# Patient Record
Sex: FEMALE | Race: WHITE | NOT HISPANIC OR LATINO | Employment: FULL TIME | URBAN - NONMETROPOLITAN AREA
[De-identification: names, ages, dates, MRNs, and addresses within clinical notes are randomized per-mention and may not be internally consistent; named-entity substitution may affect disease eponyms.]

---

## 2017-01-10 RX ORDER — NORETHINDRONE AND ETHINYL ESTRADIOL AND FERROUS FUMARATE 0.4-35(21)
1 KIT ORAL DAILY
Qty: 28 EACH | Refills: 9 | Status: SHIPPED | OUTPATIENT
Start: 2017-01-10 | End: 2017-02-13 | Stop reason: SDUPTHER

## 2017-08-24 ENCOUNTER — PATIENT MESSAGE (OUTPATIENT)
Dept: OBSTETRICS AND GYNECOLOGY | Facility: CLINIC | Age: 22
End: 2017-08-24

## 2017-08-25 NOTE — TELEPHONE ENCOUNTER
From: Nena Lee  To: BHARGAVI Soriano  Sent: 8/24/2017 5:52 PM CDT  Subject: Prescription Question    I picked up my month supply of birth control today. I have been taking Zenchent FE Chewable tablets. They gave me. Wymzya FE Chewable tablets. On my prescription it says  change. I just wanted to make sure this is equivalent to my old medication.

## 2017-10-10 DIAGNOSIS — Z30.41 USES ORAL CONTRACEPTION: Primary | ICD-10-CM

## 2017-10-10 RX ORDER — NORETHINDRONE AND ETHINYL ESTRADIOL AND FERROUS FUMARATE 0.4-35(21)
1 KIT ORAL DAILY
Qty: 28 EACH | Refills: 0 | Status: SHIPPED | OUTPATIENT
Start: 2017-10-10 | End: 2017-11-21 | Stop reason: SDUPTHER

## 2017-11-21 ENCOUNTER — OFFICE VISIT (OUTPATIENT)
Dept: OBSTETRICS AND GYNECOLOGY | Facility: CLINIC | Age: 22
End: 2017-11-21

## 2017-11-21 VITALS
BODY MASS INDEX: 21.9 KG/M2 | WEIGHT: 119 LBS | DIASTOLIC BLOOD PRESSURE: 64 MMHG | HEIGHT: 62 IN | SYSTOLIC BLOOD PRESSURE: 102 MMHG

## 2017-11-21 DIAGNOSIS — Z30.41 ENCOUNTER FOR SURVEILLANCE OF CONTRACEPTIVE PILLS: ICD-10-CM

## 2017-11-21 DIAGNOSIS — Z01.419 WELL WOMAN EXAM WITH ROUTINE GYNECOLOGICAL EXAM: Primary | ICD-10-CM

## 2017-11-21 PROCEDURE — 99395 PREV VISIT EST AGE 18-39: CPT | Performed by: NURSE PRACTITIONER

## 2017-11-21 PROCEDURE — 87491 CHLMYD TRACH DNA AMP PROBE: CPT | Performed by: NURSE PRACTITIONER

## 2017-11-21 PROCEDURE — 87481 CANDIDA DNA AMP PROBE: CPT | Performed by: NURSE PRACTITIONER

## 2017-11-21 PROCEDURE — 87661 TRICHOMONAS VAGINALIS AMPLIF: CPT | Performed by: NURSE PRACTITIONER

## 2017-11-21 PROCEDURE — 87512 GARDNER VAG DNA QUANT: CPT | Performed by: NURSE PRACTITIONER

## 2017-11-21 PROCEDURE — 87798 DETECT AGENT NOS DNA AMP: CPT | Performed by: NURSE PRACTITIONER

## 2017-11-21 PROCEDURE — 87591 N.GONORRHOEAE DNA AMP PROB: CPT | Performed by: NURSE PRACTITIONER

## 2017-11-21 PROCEDURE — G0123 SCREEN CERV/VAG THIN LAYER: HCPCS | Performed by: NURSE PRACTITIONER

## 2017-11-21 RX ORDER — NORETHINDRONE AND ETHINYL ESTRADIOL AND FERROUS FUMARATE 0.4-35(21)
1 KIT ORAL DAILY
Qty: 28 EACH | Refills: 12 | Status: SHIPPED | OUTPATIENT
Start: 2017-11-21 | End: 2018-11-12 | Stop reason: SDUPTHER

## 2017-11-21 NOTE — PROGRESS NOTES
"Mehdi Lee is a 22 y.o. female     HPI Comments: Here for yearly check up. Has no complaints.    Gynecologic Exam   The patient's pertinent negatives include no pelvic pain, vaginal bleeding or vaginal discharge. The patient is experiencing no pain. Pertinent negatives include no abdominal pain, anorexia, back pain, chills, constipation, diarrhea, discolored urine, dysuria, fever, flank pain, frequency, headaches, hematuria, joint pain, joint swelling, nausea, painful intercourse, rash, sore throat, urgency or vomiting. She is sexually active. She uses oral contraceptives for contraception. Her menstrual history has been regular.             /64  Ht 62\" (157.5 cm)  Wt 119 lb (54 kg)  LMP 11/13/2017 (Exact Date)  BMI 21.77 kg/m2      Outpatient Encounter Prescriptions as of 11/21/2017   Medication Sig Dispense Refill   • Norethin-Eth Estradiol-Fe (ZENCHENT FE) 0.4-35 MG-MCG chewable tablet Chew 1 tablet Daily. 28 each 12   • vitamin C (ASCORBIC ACID) 250 MG tablet Take 250 mg by mouth Daily.     • [DISCONTINUED] Norethin-Eth Estradiol-Fe (ZENCHENT FE) 0.4-35 MG-MCG chewable tablet Chew 1 tablet Daily. 28 each 0   • [DISCONTINUED] azithromycin (ZITHROMAX) 200 MG/5ML suspension Give 2 tsp po qday day 1, then give 1 tsp po qday day 2-5 30 mL 0     No facility-administered encounter medications on file as of 11/21/2017.            Surgical History  Past Surgical History:   Procedure Laterality Date   • WISDOM TOOTH EXTRACTION           Family History  Family History   Problem Relation Age of Onset   • No Known Problems Father    • No Known Problems Mother    • No Known Problems Sister    • Breast cancer Other 60   • Breast cancer Other 60   • Heart disease Maternal Grandfather    • Ovarian cancer Neg Hx    • Uterine cancer Neg Hx    • Colon cancer Neg Hx    • Melanoma Neg Hx    • Prostate cancer Neg Hx              The following portions of the patient's history were reviewed and updated as " appropriate: allergies, current medications, past family history, past medical history, past social history, past surgical history and problem list.    Review of Systems   Constitutional: Negative for activity change, appetite change, chills, diaphoresis, fatigue, fever and unexpected weight change.   HENT: Negative for congestion, ear discharge, ear pain, facial swelling, hearing loss, mouth sores, nosebleeds, postnasal drip, rhinorrhea, sinus pressure, sneezing, sore throat, tinnitus, trouble swallowing and voice change.    Eyes: Negative for photophobia, pain, discharge, redness, itching and visual disturbance.   Respiratory: Negative for apnea, cough, choking, chest tightness and shortness of breath.    Cardiovascular: Negative for chest pain, palpitations and leg swelling.   Gastrointestinal: Negative for abdominal distention, abdominal pain, anal bleeding, anorexia, blood in stool, constipation, diarrhea, nausea, rectal pain and vomiting.   Endocrine: Negative for cold intolerance and heat intolerance.   Genitourinary: Negative for decreased urine volume, difficulty urinating, dyspareunia, dysuria, flank pain, frequency, genital sores, hematuria, menstrual problem, pelvic pain, urgency, vaginal bleeding, vaginal discharge and vaginal pain.   Musculoskeletal: Negative for arthralgias, back pain, joint pain, joint swelling and myalgias.   Skin: Negative for color change and rash.   Allergic/Immunologic: Negative for environmental allergies.   Neurological: Negative for dizziness, syncope, weakness, numbness and headaches.   Hematological: Negative for adenopathy.   Psychiatric/Behavioral: Negative for agitation, confusion and sleep disturbance. The patient is not nervous/anxious.              Objective   Physical Exam   Constitutional: She is oriented to person, place, and time. She appears well-developed and well-nourished. No distress.   HENT:   Head: Normocephalic.   Right Ear: External ear normal.   Left  Ear: External ear normal.   Nose: Nose normal.   Mouth/Throat: Oropharynx is clear and moist.   Eyes: Conjunctivae are normal. Right eye exhibits no discharge. Left eye exhibits no discharge. No scleral icterus.   Neck: Normal range of motion. Neck supple. Carotid bruit is not present. No tracheal deviation present. No thyromegaly present.   Cardiovascular: Normal rate, regular rhythm, normal heart sounds and intact distal pulses.    No murmur heard.  Pulmonary/Chest: Effort normal and breath sounds normal. No respiratory distress. She has no wheezes. Right breast exhibits no inverted nipple, no mass, no nipple discharge, no skin change and no tenderness. Left breast exhibits no inverted nipple, no mass, no nipple discharge, no skin change and no tenderness. Breasts are symmetrical. There is no breast swelling.   Abdominal: Soft. She exhibits no distension and no mass. There is no tenderness. There is no guarding. No hernia. Hernia confirmed negative in the right inguinal area and confirmed negative in the left inguinal area.   Genitourinary: Rectum normal and uterus normal. Rectal exam shows no mass. No breast tenderness, discharge or bleeding. Pelvic exam was performed with patient supine. There is no rash, tenderness, lesion or injury on the right labia. There is no rash, tenderness, lesion or injury on the left labia. Uterus is not enlarged, not fixed and not tender. Cervix exhibits no motion tenderness, no discharge and no friability. Right adnexum displays no mass, no tenderness and no fullness. Left adnexum displays no mass, no tenderness and no fullness. No erythema, tenderness or bleeding in the vagina. No foreign body in the vagina. No signs of injury around the vagina. Vaginal discharge found.   Genitourinary Comments:   BSU normal  Urethral meatus  Normal  Perineum  Normal   Musculoskeletal: Normal range of motion. She exhibits no edema or tenderness.   Lymphadenopathy:        Head (right side): No  submental, no submandibular, no tonsillar, no preauricular, no posterior auricular and no occipital adenopathy present.        Head (left side): No submental, no submandibular, no tonsillar, no preauricular, no posterior auricular and no occipital adenopathy present.     She has no cervical adenopathy.        Right cervical: No superficial cervical, no deep cervical and no posterior cervical adenopathy present.       Left cervical: No superficial cervical, no deep cervical and no posterior cervical adenopathy present.     She has no axillary adenopathy.        Right: No inguinal adenopathy present.        Left: No inguinal adenopathy present.   Neurological: She is alert and oriented to person, place, and time. Coordination normal.   Skin: Skin is warm and dry. No bruising and no rash noted. She is not diaphoretic. No erythema.   Psychiatric: She has a normal mood and affect. Her behavior is normal. Judgment and thought content normal.   Nursing note and vitals reviewed.        Assessment/Plan   Nena was seen today for gynecologic exam.    Diagnoses and all orders for this visit:    Well woman exam with routine gynecological exam  Normal GYN exam. Will have lab work at PCP . Encouraged SBE. Discussed weight management and importance of maintaining a healthy weight. Discussed Vitamin D intake and the importance of adequate vitamin D. Discussed Daily exercise and the importance of same. BMI  21.8. BV panel added to pap.  Comments:  Orders:  -     Liquid-based Pap Smear, Screening - ThinPrep Vial, Cervix    Encounter for surveillance of contraceptive pills  Comments:  Doing well with pills.  Orders:  -     Norethin-Eth Estradiol-Fe (ZENCHENT FE) 0.4-35 MG-MCG chewable tablet; Chew 1 tablet Daily.    Vaginal discharge  BV panel added to pap.

## 2017-11-27 DIAGNOSIS — A49.3 MYCOPLASMA INFECTION: ICD-10-CM

## 2017-11-27 DIAGNOSIS — B96.89 BV (BACTERIAL VAGINOSIS): Primary | ICD-10-CM

## 2017-11-27 DIAGNOSIS — N76.0 BV (BACTERIAL VAGINOSIS): Primary | ICD-10-CM

## 2017-11-27 LAB
GEN CATEG CVX/VAG CYTO-IMP: NORMAL
LAB AP CASE REPORT: NORMAL
LAB AP GYN ADDITIONAL INFORMATION: NORMAL
Lab: NORMAL
PATH INTERP SPEC-IMP: NORMAL
STAT OF ADQ CVX/VAG CYTO-IMP: NORMAL

## 2017-11-27 RX ORDER — DOXYCYCLINE 100 MG/1
100 CAPSULE ORAL 2 TIMES DAILY
Qty: 20 CAPSULE | Refills: 0 | Status: SHIPPED | OUTPATIENT
Start: 2017-11-27 | End: 2017-12-07

## 2017-11-27 RX ORDER — METRONIDAZOLE 500 MG/1
500 TABLET ORAL 2 TIMES DAILY
Qty: 14 TABLET | Refills: 0 | Status: SHIPPED | OUTPATIENT
Start: 2017-11-27 | End: 2017-12-04

## 2017-11-28 DIAGNOSIS — N76.0 BV (BACTERIAL VAGINOSIS): Primary | ICD-10-CM

## 2017-11-28 DIAGNOSIS — B96.89 BV (BACTERIAL VAGINOSIS): Primary | ICD-10-CM

## 2017-11-28 RX ORDER — METRONIDAZOLE 7.5 MG/G
GEL VAGINAL 2 TIMES DAILY
Qty: 1 TUBE | Refills: 0 | Status: SHIPPED | OUTPATIENT
Start: 2017-11-28 | End: 2017-12-03

## 2018-11-12 ENCOUNTER — PATIENT MESSAGE (OUTPATIENT)
Dept: OBSTETRICS AND GYNECOLOGY | Facility: CLINIC | Age: 23
End: 2018-11-12

## 2018-11-12 DIAGNOSIS — Z30.41 ENCOUNTER FOR SURVEILLANCE OF CONTRACEPTIVE PILLS: ICD-10-CM

## 2018-11-12 RX ORDER — NORETHINDRONE AND ETHINYL ESTRADIOL AND FERROUS FUMARATE 0.4-35(21)
1 KIT ORAL DAILY
Qty: 28 EACH | Refills: 0 | Status: SHIPPED | OUTPATIENT
Start: 2018-11-12 | End: 2018-11-26 | Stop reason: SDUPTHER

## 2018-11-26 ENCOUNTER — PATIENT MESSAGE (OUTPATIENT)
Dept: OBSTETRICS AND GYNECOLOGY | Facility: CLINIC | Age: 23
End: 2018-11-26

## 2018-11-26 DIAGNOSIS — Z30.41 ENCOUNTER FOR SURVEILLANCE OF CONTRACEPTIVE PILLS: ICD-10-CM

## 2018-11-26 RX ORDER — NORETHINDRONE AND ETHINYL ESTRADIOL AND FERROUS FUMARATE 0.4-35(21)
1 KIT ORAL DAILY
Qty: 28 EACH | Refills: 1 | Status: SHIPPED | OUTPATIENT
Start: 2018-11-26 | End: 2019-01-25 | Stop reason: SDUPTHER

## 2019-01-25 DIAGNOSIS — Z30.41 ENCOUNTER FOR SURVEILLANCE OF CONTRACEPTIVE PILLS: ICD-10-CM

## 2019-01-25 RX ORDER — NORETHINDRONE AND ETHINYL ESTRADIOL AND FERROUS FUMARATE 0.4-35(21)
1 KIT ORAL DAILY
Qty: 28 EACH | Refills: 1 | Status: SHIPPED | OUTPATIENT
Start: 2019-01-25 | End: 2019-02-27 | Stop reason: SDUPTHER

## 2019-02-27 ENCOUNTER — OFFICE VISIT (OUTPATIENT)
Dept: OBSTETRICS AND GYNECOLOGY | Facility: CLINIC | Age: 24
End: 2019-02-27

## 2019-02-27 VITALS
SYSTOLIC BLOOD PRESSURE: 96 MMHG | WEIGHT: 116 LBS | HEIGHT: 62 IN | BODY MASS INDEX: 21.35 KG/M2 | DIASTOLIC BLOOD PRESSURE: 56 MMHG

## 2019-02-27 DIAGNOSIS — Z01.419 WELL WOMAN EXAM WITH ROUTINE GYNECOLOGICAL EXAM: Primary | ICD-10-CM

## 2019-02-27 DIAGNOSIS — Z80.3 FAMILY HISTORY OF BREAST CANCER: ICD-10-CM

## 2019-02-27 DIAGNOSIS — D22.9 NEVUS: ICD-10-CM

## 2019-02-27 DIAGNOSIS — Z30.41 ENCOUNTER FOR SURVEILLANCE OF CONTRACEPTIVE PILLS: ICD-10-CM

## 2019-02-27 PROCEDURE — G0123 SCREEN CERV/VAG THIN LAYER: HCPCS | Performed by: NURSE PRACTITIONER

## 2019-02-27 PROCEDURE — 99395 PREV VISIT EST AGE 18-39: CPT | Performed by: NURSE PRACTITIONER

## 2019-02-27 RX ORDER — NORETHINDRONE AND ETHINYL ESTRADIOL AND FERROUS FUMARATE 0.4-35(21)
1 KIT ORAL DAILY
Qty: 28 EACH | Refills: 12 | Status: SHIPPED | OUTPATIENT
Start: 2019-02-27 | End: 2019-12-13

## 2019-02-27 NOTE — PROGRESS NOTES
"Mehdi Lee is a 23 y.o. female    Pt is here for yearly check up. Has no complaints.      Gynecologic Exam   The patient's pertinent negatives include no pelvic pain, vaginal bleeding or vaginal discharge. The patient is experiencing no pain. Pertinent negatives include no abdominal pain, anorexia, back pain, chills, constipation, diarrhea, discolored urine, dysuria, fever, flank pain, frequency, headaches, hematuria, joint pain, joint swelling, nausea, painful intercourse, rash, sore throat, urgency or vomiting. Nothing aggravates the symptoms. She has tried nothing for the symptoms. She is sexually active. Her menstrual history has been regular.         BP 96/56   Ht 157.5 cm (62\")   Wt 52.6 kg (116 lb)   LMP 01/27/2019 (Approximate) Comment: bcp  BMI 21.22 kg/m²     Outpatient Encounter Medications as of 2/27/2019   Medication Sig Dispense Refill   • Norethin-Eth Estradiol-Fe (ZENCHENT FE) 0.4-35 MG-MCG chewable tablet Chew 1 tablet Daily. 28 each 12   • vitamin C (ASCORBIC ACID) 250 MG tablet Take 250 mg by mouth Daily.     • [DISCONTINUED] Norethin-Eth Estradiol-Fe (ZENCHENT FE) 0.4-35 MG-MCG chewable tablet Chew 1 tablet Daily. 28 each 1     No facility-administered encounter medications on file as of 2/27/2019.        Surgical History  Past Surgical History:   Procedure Laterality Date   • WISDOM TOOTH EXTRACTION         Family History  Family History   Problem Relation Age of Onset   • Diabetes Father    • No Known Problems Mother    • No Known Problems Sister    • Breast cancer Other 60   • Breast cancer Other 60   • Heart disease Maternal Grandfather    • Ovarian cancer Neg Hx    • Uterine cancer Neg Hx    • Colon cancer Neg Hx    • Melanoma Neg Hx    • Prostate cancer Neg Hx        The following portions of the patient's history were reviewed and updated as appropriate: allergies, current medications, past family history, past medical history, past social history, past surgical " history and problem list.    Review of Systems   Constitutional: Negative for activity change, appetite change, chills, diaphoresis, fatigue, fever, unexpected weight gain and unexpected weight loss.   HENT: Negative for congestion, dental problem, drooling, ear discharge, ear pain, facial swelling, hearing loss, mouth sores, nosebleeds, postnasal drip, rhinorrhea, sinus pressure, sneezing, sore throat, tinnitus, trouble swallowing and voice change.    Eyes: Negative for blurred vision, double vision, photophobia, pain, discharge, redness, itching and visual disturbance.   Respiratory: Negative for apnea, cough, choking, chest tightness, shortness of breath, wheezing and stridor.    Cardiovascular: Negative for chest pain, palpitations and leg swelling.   Gastrointestinal: Negative for abdominal distention, abdominal pain, anal bleeding, anorexia, blood in stool, constipation, diarrhea, nausea, rectal pain, vomiting, GERD and indigestion.   Endocrine: Negative for cold intolerance, heat intolerance, polydipsia, polyphagia and polyuria.   Genitourinary: Negative for breast discharge, urinary incontinence, decreased libido, decreased urine volume, difficulty urinating, dyspareunia, dysuria, flank pain, frequency, genital sores, hematuria, menstrual problem, pelvic pain, urgency, vaginal bleeding, vaginal discharge, vaginal pain, breast lump and breast pain.   Musculoskeletal: Negative for arthralgias, back pain, gait problem, joint pain, joint swelling, myalgias, neck pain, neck stiffness and bursitis.   Skin: Negative for color change, dry skin and rash.   Allergic/Immunologic: Negative for environmental allergies, food allergies and immunocompromised state.   Neurological: Negative for dizziness, tremors, seizures, syncope, facial asymmetry, speech difficulty, weakness, light-headedness, numbness, headache, memory problem and confusion.   Hematological: Negative for adenopathy. Does not bruise/bleed easily.    Psychiatric/Behavioral: Negative for agitation, behavioral problems, decreased concentration, dysphoric mood, hallucinations, self-injury, sleep disturbance, suicidal ideas, negative for hyperactivity, depressed mood and stress. The patient is not nervous/anxious.        Objective   Physical Exam   Constitutional: She is oriented to person, place, and time. She appears well-developed and well-nourished. No distress.   HENT:   Head: Normocephalic.   Right Ear: External ear normal.   Left Ear: External ear normal.   Nose: Nose normal.   Mouth/Throat: Oropharynx is clear and moist.   Eyes: Conjunctivae are normal. Right eye exhibits no discharge. Left eye exhibits no discharge. No scleral icterus.   Neck: Normal range of motion. Neck supple. Carotid bruit is not present. No tracheal deviation present. No thyromegaly present.   Cardiovascular: Normal rate, regular rhythm, normal heart sounds and intact distal pulses.   No murmur heard.  Pulmonary/Chest: Effort normal and breath sounds normal. No respiratory distress. She has no wheezes. Right breast exhibits no inverted nipple, no mass, no nipple discharge, no skin change and no tenderness. Left breast exhibits no inverted nipple, no mass, no nipple discharge, no skin change and no tenderness. Breasts are symmetrical. There is no breast swelling.   Abdominal: Soft. She exhibits no distension and no mass. There is no tenderness. There is no guarding. No hernia. Hernia confirmed negative in the right inguinal area and confirmed negative in the left inguinal area.   Genitourinary: Rectum normal, vagina normal and uterus normal. Rectal exam shows no mass. No breast tenderness, discharge or bleeding. Pelvic exam was performed with patient supine. There is no rash, tenderness, lesion or injury on the right labia. There is no rash, tenderness, lesion or injury on the left labia. Uterus is not enlarged, not fixed and not tender. Cervix exhibits no motion tenderness, no  discharge and no friability. Right adnexum displays no mass, no tenderness and no fullness. Left adnexum displays no mass, no tenderness and no fullness. No erythema, tenderness or bleeding in the vagina. No foreign body in the vagina. No signs of injury around the vagina. No vaginal discharge found.   Genitourinary Comments:   BSU normal  Urethral meatus  Normal  Perineum  Normal   Musculoskeletal: Normal range of motion. She exhibits no edema or tenderness.   Lymphadenopathy:        Head (right side): No submental, no submandibular, no tonsillar, no preauricular, no posterior auricular and no occipital adenopathy present.        Head (left side): No submental, no submandibular, no tonsillar, no preauricular, no posterior auricular and no occipital adenopathy present.     She has no cervical adenopathy.        Right cervical: No superficial cervical, no deep cervical and no posterior cervical adenopathy present.       Left cervical: No superficial cervical, no deep cervical and no posterior cervical adenopathy present.     She has no axillary adenopathy.        Right: No inguinal adenopathy present.        Left: No inguinal adenopathy present.   Neurological: She is alert and oriented to person, place, and time. Coordination normal.   Skin: Skin is warm and dry. No bruising and no rash noted. She is not diaphoretic. No erythema.        Psychiatric: She has a normal mood and affect. Her behavior is normal. Judgment and thought content normal.   Nursing note and vitals reviewed.      Assessment/Plan   Nena was seen today for gynecologic exam.    Diagnoses and all orders for this visit:    Well woman exam with routine gynecological exam  Normal GYN exam. Will have lab work at PCP. Encouraged SBE, pt is aware how to do self breast exam and the importance of same. Discussed weight management and importance of maintaining a healthy weight. Discussed Vitamin D intake and the importance of adequate vitamin D for both  Bone Health and a healthy immune system.  Discussed Daily exercise and the importance of same, in regards to a healthy heart as well as helping to maintain her weight and improving her mental health.  BMI  21.2. Pap smear is done per [facility] guidelines.  -     Liquid-based Pap Smear, Screening    Family history of breast cancer  Pt has a family history of Breast Cancer. We discussed Genetic screening that can be done to see if she carries the Gene for Breast, Ovarian, Colon, Melanoma, Uterine and Pancreatic Cancers. We discussed if positive she will have free Genetic counseling through [facility]. We also discussed if she does have the positive gene she can have more testing yearly, and even surgery if desired.    Encounter for surveillance of contraceptive pills  Comments:  Doing well with pills. RF given.  Orders:  -     Norethin-Eth Estradiol-Fe (ZENCHENT FE) 0.4-35 MG-MCG chewable tablet; Chew 1 tablet Daily.    Nevus  Comments:  Pt has multiple dark nevi. Referral made to Dr. RAMILA Lynne.               Ambulatory Referral to Dermatology       Patient's Body mass index is 21.22 kg/m². BMI is within normal parameters. No follow-up required..      Jannie Lynch, APRN  2/27/2019

## 2019-02-27 NOTE — PATIENT INSTRUCTIONS

## 2019-02-28 LAB
GEN CATEG CVX/VAG CYTO-IMP: NORMAL
LAB AP CASE REPORT: NORMAL
LAB AP GYN ADDITIONAL INFORMATION: NORMAL
LAB AP GYN OTHER FINDINGS: NORMAL
PATH INTERP SPEC-IMP: NORMAL
STAT OF ADQ CVX/VAG CYTO-IMP: NORMAL

## 2019-02-28 RX ORDER — FLUCONAZOLE 150 MG/1
150 TABLET ORAL ONCE
Qty: 2 TABLET | Refills: 0 | Status: SHIPPED | OUTPATIENT
Start: 2019-02-28 | End: 2019-02-28

## 2019-03-04 ENCOUNTER — TELEPHONE (OUTPATIENT)
Dept: OBSTETRICS AND GYNECOLOGY | Facility: CLINIC | Age: 24
End: 2019-03-04

## 2019-03-04 NOTE — TELEPHONE ENCOUNTER
----- Message from BHARGAVI Soriano sent at 2/28/2019  4:14 PM CST -----  Normal pap. Has yeast on pap and I sent Diflucan. TRC

## 2019-03-28 DIAGNOSIS — Z30.41 ENCOUNTER FOR SURVEILLANCE OF CONTRACEPTIVE PILLS: ICD-10-CM

## 2019-03-28 RX ORDER — NORETHINDRONE AND ETHINYL ESTRADIOL 0.4-35(21)
KIT ORAL
Refills: 0 | OUTPATIENT
Start: 2019-03-28

## 2019-04-01 ENCOUNTER — HOSPITAL ENCOUNTER (OUTPATIENT)
Dept: GENERAL RADIOLOGY | Facility: HOSPITAL | Age: 24
Discharge: HOME | End: 2019-04-01
Admitting: NURSE PRACTITIONER

## 2019-04-01 ENCOUNTER — TRANSCRIBE ORDERS (OUTPATIENT)
Dept: ADMINISTRATIVE | Facility: HOSPITAL | Age: 24
End: 2019-04-01

## 2019-04-01 DIAGNOSIS — R00.2 PALPITATIONS: ICD-10-CM

## 2019-04-01 DIAGNOSIS — R00.0 TACHYCARDIA: ICD-10-CM

## 2019-04-01 DIAGNOSIS — R00.0 TACHYCARDIA: Primary | ICD-10-CM

## 2019-04-01 DIAGNOSIS — R00.0 TACHYCARDIA, UNSPECIFIED: Primary | ICD-10-CM

## 2019-04-01 PROCEDURE — 71046 X-RAY EXAM CHEST 2 VIEWS: CPT

## 2019-04-03 ENCOUNTER — HOSPITAL ENCOUNTER (OUTPATIENT)
Dept: CARDIOLOGY | Facility: HOSPITAL | Age: 24
Discharge: HOME | End: 2019-04-03
Admitting: NURSE PRACTITIONER

## 2019-04-03 PROCEDURE — 93226 XTRNL ECG REC<48 HR SCAN A/R: CPT

## 2019-04-03 PROCEDURE — 93225 XTRNL ECG REC<48 HRS REC: CPT

## 2019-04-05 PROCEDURE — 93227 XTRNL ECG REC<48 HR R&I: CPT | Performed by: INTERNAL MEDICINE

## 2019-04-08 ENCOUNTER — APPOINTMENT (OUTPATIENT)
Dept: CARDIOLOGY | Facility: HOSPITAL | Age: 24
End: 2019-04-08

## 2019-12-10 DIAGNOSIS — Z30.41 ENCOUNTER FOR SURVEILLANCE OF CONTRACEPTIVE PILLS: ICD-10-CM

## 2019-12-13 RX ORDER — NORETHINDRONE AND ETHINYL ESTRADIOL 0.4-35(21)
KIT ORAL
Qty: 28 EACH | Refills: 1 | Status: SHIPPED | OUTPATIENT
Start: 2019-12-13 | End: 2020-02-17 | Stop reason: SDUPTHER

## 2020-02-17 DIAGNOSIS — Z30.41 ENCOUNTER FOR SURVEILLANCE OF CONTRACEPTIVE PILLS: ICD-10-CM

## 2020-02-17 RX ORDER — NORETHINDRONE AND ETHINYL ESTRADIOL AND FERROUS FUMARATE 0.4-35(21)
1 KIT ORAL DAILY
Qty: 28 EACH | Refills: 1 | Status: SHIPPED | OUTPATIENT
Start: 2020-02-17 | End: 2020-03-13 | Stop reason: SDUPTHER

## 2020-03-13 ENCOUNTER — OFFICE VISIT (OUTPATIENT)
Dept: OBSTETRICS AND GYNECOLOGY | Facility: CLINIC | Age: 25
End: 2020-03-13

## 2020-03-13 VITALS
WEIGHT: 116 LBS | BODY MASS INDEX: 21.35 KG/M2 | DIASTOLIC BLOOD PRESSURE: 70 MMHG | HEIGHT: 62 IN | SYSTOLIC BLOOD PRESSURE: 100 MMHG

## 2020-03-13 DIAGNOSIS — N89.8 VAGINAL DISCHARGE: ICD-10-CM

## 2020-03-13 DIAGNOSIS — Z13.21 ENCOUNTER FOR VITAMIN DEFICIENCY SCREENING: ICD-10-CM

## 2020-03-13 DIAGNOSIS — Z01.419 WELL WOMAN EXAM WITH ROUTINE GYNECOLOGICAL EXAM: Primary | ICD-10-CM

## 2020-03-13 DIAGNOSIS — Z30.41 ENCOUNTER FOR SURVEILLANCE OF CONTRACEPTIVE PILLS: ICD-10-CM

## 2020-03-13 PROCEDURE — G0123 SCREEN CERV/VAG THIN LAYER: HCPCS | Performed by: NURSE PRACTITIONER

## 2020-03-13 PROCEDURE — 99395 PREV VISIT EST AGE 18-39: CPT | Performed by: NURSE PRACTITIONER

## 2020-03-13 PROCEDURE — 87512 GARDNER VAG DNA QUANT: CPT | Performed by: NURSE PRACTITIONER

## 2020-03-13 PROCEDURE — 87661 TRICHOMONAS VAGINALIS AMPLIF: CPT | Performed by: NURSE PRACTITIONER

## 2020-03-13 PROCEDURE — 87798 DETECT AGENT NOS DNA AMP: CPT | Performed by: NURSE PRACTITIONER

## 2020-03-13 PROCEDURE — 87481 CANDIDA DNA AMP PROBE: CPT | Performed by: NURSE PRACTITIONER

## 2020-03-13 PROCEDURE — 87563 M. GENITALIUM AMP PROBE: CPT | Performed by: NURSE PRACTITIONER

## 2020-03-13 RX ORDER — FLUCONAZOLE 150 MG/1
150 TABLET ORAL ONCE
Qty: 2 TABLET | Refills: 0 | Status: SHIPPED | OUTPATIENT
Start: 2020-03-13 | End: 2020-03-13

## 2020-03-13 RX ORDER — NORETHINDRONE AND ETHINYL ESTRADIOL AND FERROUS FUMARATE 0.4-35(21)
1 KIT ORAL DAILY
Qty: 28 EACH | Refills: 12 | Status: SHIPPED | OUTPATIENT
Start: 2020-03-13 | End: 2020-08-28

## 2020-03-13 NOTE — PROGRESS NOTES
"Mehdi Lee is a 24 y.o. female    Patient is here for yearly checkup.  She has complaints of vaginal discharge.  She is getting  in the next couple of weeks.    Gynecologic Exam   The patient's primary symptoms include vaginal discharge. The patient's pertinent negatives include no pelvic pain or vaginal bleeding. The patient is experiencing no pain. Pertinent negatives include no abdominal pain, anorexia, back pain, chills, constipation, diarrhea, discolored urine, dysuria, fever, flank pain, frequency, headaches, hematuria, joint pain, joint swelling, nausea, painful intercourse, rash, sore throat, urgency or vomiting. The vaginal discharge was yellow. The vaginal bleeding is typical of menses. She has not been passing clots. She has not been passing tissue. She is sexually active. She uses oral contraceptives for contraception. Her menstrual history has been regular.         /70   Ht 157.5 cm (62\")   Wt 52.6 kg (116 lb)   LMP 01/13/2020 (Approximate)   Breastfeeding No   BMI 21.22 kg/m²     Outpatient Encounter Medications as of 3/13/2020   Medication Sig Dispense Refill   • Norethin-Eth Estradiol-Fe (WYMZYA FE) 0.4-35 MG-MCG chewable tablet Chew 1 tablet Daily. 28 each 12   • vitamin C (ASCORBIC ACID) 250 MG tablet Take 250 mg by mouth Daily.     • [DISCONTINUED] Norethin-Eth Estradiol-Fe (WYMZYA FE) 0.4-35 MG-MCG chewable tablet Chew 1 tablet Daily. 28 each 1   • fluconazole (DIFLUCAN) 150 MG tablet Take 1 tablet by mouth 1 (One) Time for 1 dose. Take once a week for 2 weeks. 2 tablet 0     No facility-administered encounter medications on file as of 3/13/2020.        Surgical History  Past Surgical History:   Procedure Laterality Date   • WISDOM TOOTH EXTRACTION         Family History  Family History   Problem Relation Age of Onset   • Diabetes Father    • Hyperthyroidism Mother    • No Known Problems Sister    • Breast cancer Other 60   • Heart disease Maternal " Grandfather    • Ovarian cancer Neg Hx    • Uterine cancer Neg Hx    • Colon cancer Neg Hx    • Melanoma Neg Hx    • Prostate cancer Neg Hx        The following portions of the patient's history were reviewed and updated as appropriate: allergies, current medications, past family history, past medical history, past social history, past surgical history and problem list.    Review of Systems   Constitutional: Negative for activity change, appetite change, chills, diaphoresis, fatigue, fever, unexpected weight gain and unexpected weight loss.   HENT: Negative for congestion, dental problem, drooling, ear discharge, ear pain, facial swelling, hearing loss, mouth sores, nosebleeds, postnasal drip, rhinorrhea, sinus pressure, sneezing, sore throat, swollen glands, tinnitus, trouble swallowing and voice change.    Eyes: Negative for blurred vision, double vision, photophobia, pain, discharge, redness, itching and visual disturbance.   Respiratory: Negative for apnea, cough, choking, chest tightness, shortness of breath, wheezing and stridor.    Cardiovascular: Negative for chest pain, palpitations and leg swelling.   Gastrointestinal: Negative for abdominal distention, abdominal pain, anal bleeding, anorexia, blood in stool, constipation, diarrhea, nausea, rectal pain, vomiting, GERD and indigestion.   Endocrine: Negative for cold intolerance, heat intolerance, polydipsia, polyphagia and polyuria.   Genitourinary: Positive for vaginal discharge. Negative for amenorrhea, breast discharge, breast lump, breast pain, decreased libido, decreased urine volume, difficulty urinating, dyspareunia, dysuria, flank pain, frequency, genital sores, hematuria, menstrual problem, pelvic pain, pelvic pressure, urgency, urinary incontinence, vaginal bleeding and vaginal pain.   Musculoskeletal: Negative for arthralgias, back pain, gait problem, joint pain, joint swelling, myalgias, neck pain, neck stiffness and bursitis.   Skin: Negative  for color change, dry skin and rash.   Allergic/Immunologic: Negative for environmental allergies, food allergies and immunocompromised state.   Neurological: Negative for dizziness, tremors, seizures, syncope, facial asymmetry, speech difficulty, weakness, light-headedness, numbness, headache, memory problem and confusion.   Hematological: Negative for adenopathy. Does not bruise/bleed easily.   Psychiatric/Behavioral: Negative for agitation, behavioral problems, decreased concentration, dysphoric mood, hallucinations, self-injury, sleep disturbance, suicidal ideas, negative for hyperactivity, depressed mood and stress. The patient is not nervous/anxious.        Objective   Physical Exam   Constitutional: She is oriented to person, place, and time. She appears well-developed and well-nourished. No distress.   HENT:   Head: Normocephalic.   Right Ear: External ear normal.   Left Ear: External ear normal.   Nose: Nose normal.   Mouth/Throat: Oropharynx is clear and moist.   Eyes: Conjunctivae are normal. Right eye exhibits no discharge. Left eye exhibits no discharge. No scleral icterus.   Neck: Normal range of motion. Neck supple. Carotid bruit is not present. No tracheal deviation present. No thyromegaly present.   Cardiovascular: Normal rate, regular rhythm, normal heart sounds and intact distal pulses.   No murmur heard.  Pulmonary/Chest: Effort normal and breath sounds normal. No respiratory distress. She has no wheezes. Right breast exhibits no inverted nipple, no mass, no nipple discharge, no skin change and no tenderness. Left breast exhibits no inverted nipple, no mass, no nipple discharge, no skin change and no tenderness. No breast swelling, tenderness, discharge or bleeding. Breasts are symmetrical.   Abdominal: Soft. She exhibits no distension and no mass. There is no tenderness. There is no guarding. No hernia. Hernia confirmed negative in the right inguinal area and confirmed negative in the left  inguinal area.   Genitourinary: Rectum normal and uterus normal. Rectal exam shows no mass. No breast swelling, tenderness, discharge or bleeding. Pelvic exam was performed with patient supine. There is no rash, tenderness, lesion or injury on the right labia. There is no rash, tenderness, lesion or injury on the left labia. Uterus is not enlarged, not fixed and not tender. Cervix exhibits no motion tenderness, no discharge and no friability. Right adnexum displays no mass, no tenderness and no fullness. Left adnexum displays no mass, no tenderness and no fullness. No erythema, tenderness or bleeding in the vagina. No foreign body in the vagina. No signs of injury around the vagina. Vaginal discharge found.   Genitourinary Comments:   BSU normal  Urethral meatus  Normal  Perineum  Normal   Musculoskeletal: Normal range of motion. She exhibits no edema or tenderness.   Lymphadenopathy:        Head (right side): No submental, no submandibular, no tonsillar, no preauricular, no posterior auricular and no occipital adenopathy present.        Head (left side): No submental, no submandibular, no tonsillar, no preauricular, no posterior auricular and no occipital adenopathy present.     She has no cervical adenopathy.        Right cervical: No superficial cervical, no deep cervical and no posterior cervical adenopathy present.       Left cervical: No superficial cervical, no deep cervical and no posterior cervical adenopathy present.     She has no axillary adenopathy.        Right: No inguinal adenopathy present.        Left: No inguinal adenopathy present.   Neurological: She is alert and oriented to person, place, and time. Coordination normal.   Skin: Skin is warm and dry. No bruising and no rash noted. She is not diaphoretic. No erythema.   Psychiatric: She has a normal mood and affect. Her behavior is normal. Judgment and thought content normal.   Nursing note and vitals reviewed.      Assessment/Plan   Nena jacques  seen today for gynecologic exam.    Diagnoses and all orders for this visit:    Well woman exam with routine gynecological exam  Normal GYN exam. Will have lab work today. Encouraged SBE, pt is aware how to do self breast exam and the importance of same. Discussed weight management and importance of maintaining a healthy weight. Discussed Vitamin D intake and the importance of adequate vitamin D for both Bone Health and a healthy immune system.  Discussed Daily exercise and the importance of same, in regards to a healthy heart as well as helping to maintain her weight and improving her mental health.  BMI 21.2.  Pap smear is done per ASCCP guidelines.  -     Liquid-based Pap Smear, Screening  -     CBC & Differential  -     Comprehensive Metabolic Panel  -     Lipid Panel With LDL / HDL Ratio  -     TSH  -     T3, Uptake  -     T4, Free  -     Hemoglobin A1c  -     UA / M With / Rflx Culture([facility] ONLY) - Urine, Clean Catch    Encounter for surveillance of contraceptive pills  Comments:  Doing well with pills.  Refill given.  Orders:  -     Norethin-Eth Estradiol-Fe (WYMZYA FE) 0.4-35 MG-MCG chewable tablet; Chew 1 tablet Daily.    Vaginal discharge  Comments:  Has a moderate amount of yellow discharge.  BV panel was added to Pap.  Orders:  -     Liquid-based Pap Smear, Screening  -     fluconazole (DIFLUCAN) 150 MG tablet; Take 1 tablet by mouth 1 (One) Time for 1 dose. Take once a week for 2 weeks.    Encounter for vitamin deficiency screening  Comments:  Patient will have lab work drawn today.  Orders:  -     Vitamin D 25 Hydroxy         Patient's Body mass index is 21.22 kg/m². BMI is within normal parameters. No follow-up required..      Jannie Lynch, APRN  3/13/2020

## 2020-03-13 NOTE — PATIENT INSTRUCTIONS

## 2020-03-18 LAB — TRICHOMONAS VAGINALIS PCR: NOT DETECTED

## 2020-03-19 RX ORDER — METRONIDAZOLE 500 MG/1
500 TABLET ORAL 2 TIMES DAILY
Qty: 14 TABLET | Refills: 0 | Status: SHIPPED | OUTPATIENT
Start: 2020-03-19 | End: 2020-03-26

## 2020-06-20 ENCOUNTER — TRANSCRIBE ORDERS (OUTPATIENT)
Dept: ADMINISTRATIVE | Facility: HOSPITAL | Age: 25
End: 2020-06-20

## 2020-06-20 DIAGNOSIS — U07.1 COVID-19: Primary | ICD-10-CM

## 2020-06-21 ENCOUNTER — TRANSCRIBE ORDERS (OUTPATIENT)
Dept: ADMINISTRATIVE | Facility: HOSPITAL | Age: 25
End: 2020-06-21

## 2020-06-22 ENCOUNTER — LAB (OUTPATIENT)
Dept: LAB | Facility: HOSPITAL | Age: 25
End: 2020-06-22

## 2020-06-22 ENCOUNTER — APPOINTMENT (OUTPATIENT)
Dept: LAB | Facility: HOSPITAL | Age: 25
End: 2020-06-22

## 2020-06-22 LAB — SARS-COV-2 ORF1AB RESP QL NAA+PROBE: NOT DETECTED

## 2020-06-22 PROCEDURE — U0004 COV-19 TEST NON-CDC HGH THRU: HCPCS | Performed by: OBSTETRICS & GYNECOLOGY

## 2020-08-28 DIAGNOSIS — Z30.41 ENCOUNTER FOR SURVEILLANCE OF CONTRACEPTIVE PILLS: ICD-10-CM

## 2020-08-28 RX ORDER — NORETHINDRONE AND ETHINYL ESTRADIOL 0.4-35(21)
KIT ORAL
Qty: 28 EACH | Refills: 2 | Status: SHIPPED | OUTPATIENT
Start: 2020-08-28 | End: 2020-11-21 | Stop reason: SDUPTHER

## 2020-11-21 DIAGNOSIS — Z30.41 ENCOUNTER FOR SURVEILLANCE OF CONTRACEPTIVE PILLS: ICD-10-CM

## 2020-11-23 DIAGNOSIS — Z30.41 ENCOUNTER FOR SURVEILLANCE OF CONTRACEPTIVE PILLS: ICD-10-CM

## 2020-11-23 RX ORDER — NORETHINDRONE AND ETHINYL ESTRADIOL AND FERROUS FUMARATE 0.4-35(21)
1 KIT ORAL DAILY
Qty: 28 EACH | Refills: 4 | Status: SHIPPED | OUTPATIENT
Start: 2020-11-23 | End: 2020-12-11

## 2020-11-23 RX ORDER — NORETHINDRONE AND ETHINYL ESTRADIOL AND FERROUS FUMARATE 0.4-35(21)
1 KIT ORAL DAILY
Qty: 28 EACH | Refills: 4 | OUTPATIENT
Start: 2020-11-23

## 2020-12-11 DIAGNOSIS — Z30.41 ENCOUNTER FOR SURVEILLANCE OF CONTRACEPTIVE PILLS: ICD-10-CM

## 2020-12-11 RX ORDER — NORETHINDRONE AND ETHINYL ESTRADIOL AND FERROUS FUMARATE 0.4-35(21)
1 KIT ORAL DAILY
Qty: 28 EACH | Refills: 4 | Status: SHIPPED | OUTPATIENT
Start: 2020-12-11 | End: 2021-05-21

## 2020-12-11 NOTE — TELEPHONE ENCOUNTER
Pt says her OCP is cheaper at [facility] and wants to switch pharmacies. See pended med adjustment.

## 2021-05-21 ENCOUNTER — OFFICE VISIT (OUTPATIENT)
Dept: OBSTETRICS AND GYNECOLOGY | Facility: CLINIC | Age: 26
End: 2021-05-21

## 2021-05-21 VITALS
WEIGHT: 117 LBS | BODY MASS INDEX: 21.53 KG/M2 | HEIGHT: 62 IN | SYSTOLIC BLOOD PRESSURE: 106 MMHG | DIASTOLIC BLOOD PRESSURE: 60 MMHG

## 2021-05-21 DIAGNOSIS — N89.8 VAGINAL DISCHARGE: ICD-10-CM

## 2021-05-21 DIAGNOSIS — Z31.69 PRE-CONCEPTION COUNSELING: ICD-10-CM

## 2021-05-21 DIAGNOSIS — Z01.419 WELL WOMAN EXAM WITH ROUTINE GYNECOLOGICAL EXAM: Primary | ICD-10-CM

## 2021-05-21 DIAGNOSIS — Z11.3 SCREENING EXAMINATION FOR STD (SEXUALLY TRANSMITTED DISEASE): ICD-10-CM

## 2021-05-21 PROCEDURE — G0123 SCREEN CERV/VAG THIN LAYER: HCPCS | Performed by: NURSE PRACTITIONER

## 2021-05-21 PROCEDURE — 87481 CANDIDA DNA AMP PROBE: CPT | Performed by: NURSE PRACTITIONER

## 2021-05-21 PROCEDURE — 87563 M. GENITALIUM AMP PROBE: CPT | Performed by: NURSE PRACTITIONER

## 2021-05-21 PROCEDURE — 87591 N.GONORRHOEAE DNA AMP PROB: CPT | Performed by: NURSE PRACTITIONER

## 2021-05-21 PROCEDURE — 87798 DETECT AGENT NOS DNA AMP: CPT | Performed by: NURSE PRACTITIONER

## 2021-05-21 PROCEDURE — 87661 TRICHOMONAS VAGINALIS AMPLIF: CPT | Performed by: NURSE PRACTITIONER

## 2021-05-21 PROCEDURE — 87491 CHLMYD TRACH DNA AMP PROBE: CPT | Performed by: NURSE PRACTITIONER

## 2021-05-21 PROCEDURE — 99395 PREV VISIT EST AGE 18-39: CPT | Performed by: NURSE PRACTITIONER

## 2021-05-21 PROCEDURE — 87512 GARDNER VAG DNA QUANT: CPT | Performed by: NURSE PRACTITIONER

## 2021-05-21 RX ORDER — PRENATAL VIT NO.126/IRON/FOLIC 28MG-0.8MG
TABLET ORAL DAILY
COMMUNITY

## 2021-05-21 NOTE — PATIENT INSTRUCTIONS
"BMI for Adults  What is BMI?  Body mass index (BMI) is a number that is calculated from a person's weight and height. BMI can help estimate how much of a person's weight is composed of fat. BMI does not measure body fat directly. Rather, it is an alternative to procedures that directly measure body fat, which can be difficult and expensive.  BMI can help identify people who may be at higher risk for certain medical problems.  What are BMI measurements used for?  BMI is used as a screening tool to identify possible weight problems. It helps determine whether a person is obese, overweight, a healthy weight, or underweight.  BMI is useful for:  · Identifying a weight problem that may be related to a medical condition or may increase the risk for medical problems.  · Promoting changes, such as changes in diet and exercise, to help reach a healthy weight. BMI screening can be repeated to see if these changes are working.  How is BMI calculated?  BMI involves measuring your weight in relation to your height. Both height and weight are measured, and the BMI is calculated from those numbers. This can be done either in English (U[place] or metric measurements. Note that charts and online BMI calculators are available to help you find your BMI quickly and easily without having to do these calculations yourself.  To calculate your BMI in English (U.S[place] measurements:    1. Measure your weight in pounds (lb).  2. Multiply the number of pounds by 703.  ? For example, for a person who weighs 180 lb, multiply that number by 703, which equals 126,540.  3. Measure your height in inches. Then multiply that number by itself to get a measurement called \"inches squared.\"  ? For example, for a person who is 70 inches tall, the \"inches squared\" measurement is 70 inches x 70 inches, which equals 4,900 inches squared.  4. Divide the total from step 2 (number of lb x 703) by the total from step 3 (inches squared): 126,540 ÷ 4,900 = 25.8. This is " "your BMI.  To calculate your BMI in metric measurements:  1. Measure your weight in kilograms (kg).  2. Measure your height in meters (m). Then multiply that number by itself to get a measurement called \"meters squared.\"  ? For example, for a person who is 1.75 m tall, the \"meters squared\" measurement is 1.75 m x 1.75 m, which is equal to 3.1 meters squared.  3. Divide the number of kilograms (your weight) by the meters squared number. In this example: 70 ÷ 3.1 = 22.6. This is your BMI.  What do the results mean?  BMI charts are used to identify whether you are underweight, normal weight, overweight, or obese. The following guidelines will be used:  · Underweight: BMI less than 18.5.  · Normal weight: BMI between 18.5 and 24.9.  · Overweight: BMI between 25 and 29.9.  · Obese: BMI of 30 or above.  Keep these notes in mind:  · Weight includes both fat and muscle, so someone with a muscular build, such as an athlete, may have a BMI that is higher than 24.9. In cases like these, BMI is not an accurate measure of body fat.  · To determine if excess body fat is the cause of a BMI of 25 or higher, further assessments may need to be done by a health care provider.  · BMI is usually interpreted in the same way for men and women.  Where to find more information  For more information about BMI, including tools to quickly calculate your BMI, go to these websites:  · Centers for Disease Control and Prevention: www.cdc.gov  · American Heart Association: www.heart.org  · National Heart, Lung, and Blood [place]: www.nhlbi.nih.gov  Summary  · Body mass index (BMI) is a number that is calculated from a person's weight and height.  · BMI may help estimate how much of a person's weight is composed of fat. BMI can help identify those who may be at higher risk for certain medical problems.  · BMI can be measured using English measurements or metric measurements.  · BMI charts are used to identify whether you are underweight, normal " weight, overweight, or obese.  This information is not intended to replace advice given to you by your health care provider. Make sure you discuss any questions you have with your health care provider.  Document Revised: 09/09/2020 Document Reviewed: 07/17/2020  Elsevier Patient Education © 2021 [employer]

## 2021-05-21 NOTE — PROGRESS NOTES
"Mehdi YOON is a 25 y.o. female    Patient is here for yearly checkup.  She is been off her birth control pills for 1 month.  They are going to try to start getting pregnant in July.  She has no complaints.    Gynecologic Exam  The patient's pertinent negatives include no pelvic pain, vaginal bleeding or vaginal discharge. The patient is experiencing no pain. Pertinent negatives include no abdominal pain, anorexia, back pain, chills, constipation, diarrhea, discolored urine, dysuria, fever, flank pain, frequency, headaches, hematuria, joint pain, joint swelling, nausea, painful intercourse, rash, sore throat, urgency or vomiting. She is sexually active. She uses nothing for contraception. Her menstrual history has been regular.         /60   Ht 157.5 cm (62.01\")   Wt 53.1 kg (117 lb)   LMP 05/11/2021 (Exact Date)   Breastfeeding No   BMI 21.39 kg/m²     Outpatient Encounter Medications as of 5/21/2021   Medication Sig Dispense Refill   • prenatal vitamin (prenatal, CLASSIC, vitamin) tablet Take  by mouth Daily.     • vitamin C (ASCORBIC ACID) 250 MG tablet Take 250 mg by mouth Daily.     • [DISCONTINUED] Norethin-Eth Estradiol-Fe (Wymzya Fe) 0.4-35 MG-MCG chewable tablet Chew 1 tablet Daily. 28 each 4     No facility-administered encounter medications on file as of 5/21/2021.       Surgical History  Past Surgical History:   Procedure Laterality Date   • WISDOM TOOTH EXTRACTION         Family History  Family History   Problem Relation Age of Onset   • Diabetes Father    • Hyperthyroidism Mother    • No Known Problems Sister    • Breast cancer Other 60   • Heart disease Maternal Grandfather    • Ovarian cancer Neg Hx    • Uterine cancer Neg Hx    • Colon cancer Neg Hx    • Melanoma Neg Hx    • Prostate cancer Neg Hx        The following portions of the patient's history were reviewed and updated as appropriate: allergies, current medications, past family history, past medical history, " past social history, past surgical history and problem list.    Review of Systems   Constitutional: Negative for activity change, appetite change, chills, diaphoresis, fatigue, fever, unexpected weight gain and unexpected weight loss.   HENT: Negative for congestion, dental problem, drooling, ear discharge, ear pain, facial swelling, hearing loss, mouth sores, nosebleeds, postnasal drip, rhinorrhea, sinus pressure, sneezing, sore throat, swollen glands, tinnitus, trouble swallowing and voice change.    Eyes: Negative for blurred vision, double vision, photophobia, pain, discharge, redness, itching and visual disturbance.   Respiratory: Negative for apnea, cough, choking, chest tightness, shortness of breath, wheezing and stridor.    Cardiovascular: Negative for chest pain, palpitations and leg swelling.   Gastrointestinal: Negative for abdominal distention, abdominal pain, anal bleeding, anorexia, blood in stool, constipation, diarrhea, nausea, rectal pain, vomiting, GERD and indigestion.   Endocrine: Negative for cold intolerance, heat intolerance, polydipsia, polyphagia and polyuria.   Genitourinary: Negative for amenorrhea, breast discharge, breast lump, breast pain, decreased libido, decreased urine volume, difficulty urinating, dyspareunia, dysuria, flank pain, frequency, genital sores, hematuria, menstrual problem, pelvic pain, pelvic pressure, urgency, urinary incontinence, vaginal bleeding, vaginal discharge and vaginal pain.   Musculoskeletal: Negative for arthralgias, back pain, gait problem, joint pain, joint swelling, myalgias, neck pain, neck stiffness and bursitis.   Skin: Negative for color change, dry skin and rash.   Allergic/Immunologic: Negative for environmental allergies, food allergies and immunocompromised state.   Neurological: Negative for dizziness, tremors, seizures, syncope, facial asymmetry, speech difficulty, weakness, light-headedness, numbness, headache, memory problem and confusion.    Hematological: Negative for adenopathy. Does not bruise/bleed easily.   Psychiatric/Behavioral: Negative for agitation, behavioral problems, decreased concentration, dysphoric mood, hallucinations, self-injury, sleep disturbance, suicidal ideas, negative for hyperactivity, depressed mood and stress. The patient is not nervous/anxious.        Objective   Physical Exam  Vitals and nursing note reviewed. Exam conducted with a chaperone present.   Constitutional:       General: She is not in acute distress.     Appearance: She is well-developed. She is not diaphoretic.   HENT:      Head: Normocephalic.      Right Ear: External ear normal.      Left Ear: External ear normal.      Nose: Nose normal.   Eyes:      General: No scleral icterus.        Right eye: No discharge.         Left eye: No discharge.      Conjunctiva/sclera: Conjunctivae normal.      Pupils: Pupils are equal, round, and reactive to light.   Neck:      Thyroid: No thyromegaly.      Vascular: No carotid bruit.      Trachea: No tracheal deviation.   Cardiovascular:      Rate and Rhythm: Normal rate and regular rhythm.      Heart sounds: Normal heart sounds. No murmur heard.     Pulmonary:      Effort: Pulmonary effort is normal. No respiratory distress.      Breath sounds: Normal breath sounds. No wheezing.   Chest:      Breasts: Breasts are symmetrical.         Right: Normal. No swelling, bleeding, inverted nipple, mass, nipple discharge, skin change or tenderness.         Left: Normal. No swelling, bleeding, inverted nipple, mass, nipple discharge, skin change or tenderness.   Abdominal:      General: There is no distension.      Palpations: Abdomen is soft. There is no mass.      Tenderness: There is no abdominal tenderness. There is no right CVA tenderness, left CVA tenderness or guarding.      Hernia: No hernia is present. There is no hernia in the left inguinal area or right inguinal area.   Genitourinary:     General: Normal vulva.      Exam  position: Lithotomy position.      Labia:         Right: No rash, tenderness, lesion or injury.         Left: No rash, tenderness, lesion or injury.       Vagina: Normal. No signs of injury and foreign body. No vaginal discharge, erythema, tenderness or bleeding.      Cervix: Normal.      Uterus: Normal. Not enlarged, not fixed and not tender.       Adnexa: Right adnexa normal and left adnexa normal.        Right: No mass, tenderness or fullness.          Left: No mass, tenderness or fullness.        Rectum: Normal. No mass.      Comments:   BSU normal  Urethral meatus  Normal  Perineum  Normal  Musculoskeletal:         General: No tenderness. Normal range of motion.      Cervical back: Normal range of motion and neck supple.   Lymphadenopathy:      Head:      Right side of head: No submental, submandibular, tonsillar, preauricular, posterior auricular or occipital adenopathy.      Left side of head: No submental, submandibular, tonsillar, preauricular, posterior auricular or occipital adenopathy.      Cervical: No cervical adenopathy.      Right cervical: No superficial, deep or posterior cervical adenopathy.     Left cervical: No superficial, deep or posterior cervical adenopathy.      Upper Body:      Right upper body: No supraclavicular, axillary or pectoral adenopathy.      Left upper body: No supraclavicular, axillary or pectoral adenopathy.      Lower Body: No right inguinal adenopathy. No left inguinal adenopathy.   Skin:     General: Skin is warm and dry.      Findings: No bruising, erythema or rash.   Neurological:      Mental Status: She is alert and oriented to person, place, and time.      Coordination: Coordination normal.   Psychiatric:         Mood and Affect: Mood normal.         Behavior: Behavior normal.         Thought Content: Thought content normal.         Judgment: Judgment normal.         Assessment/Plan   Diagnoses and all orders for this visit:    1. Well woman exam with routine  gynecological exam (Primary)  Normal GYN exam. Will have lab work at PCP. Encouraged SBE, pt is aware how to do self breast exam and the importance of same. Discussed weight management and importance of maintaining a healthy weight. Discussed Vitamin D intake and the importance of adequate vitamin D for both Bone Health and a healthy immune system.  Discussed Daily exercise and the importance of same, in regards to a healthy heart as well as helping to maintain her weight and improving her mental health.  BMI 21.4. Pap smear is done per ASCCP guidelines.  -     Liquid-based Pap Smear, Screening    2. Pre-conception counseling  Comments:  Patient has been off her birth control pills for 1 cycle.  They are going to start trying to get pregnant in July.  She is on prenatal vitamins.Folic acid for the prevention of neural tube defects was discussed.  At least 0.4 mg should be taken preconceptionally to reduce the risk.  It was explained that this may reduce the baseline incidence of neural tube defect by as much as 65%.  Folic acid can be found in OTC multivitamins, OTC prenatal vitamins or breakfast cereal that that contains 100% of the RDA of folate. She is encouraged to stop drinking alcohol and to stop smoking if she smokes. Encouraged to eat a healthy diet.       3. Screening examination for STD (sexually transmitted disease)  Comments:  STD screening is done per guidelines.  Orders:  -     Liquid-based Pap Smear, Screening    4. Vaginal discharge  Comments:  Patient has a small amount of white curd-like discharge.  BV panel is added to Pap smear.  Orders:  -     Liquid-based Pap Smear, Screening         Patient's Body mass index is 21.39 kg/m². indicating that she is within normal range (BMI 18.5-24.9). No BMI management plan needed..      Jannie Lynch, APRN  5/21/2021

## 2021-05-24 LAB
C TRACH RRNA CVX QL NAA+PROBE: NOT DETECTED
N GONORRHOEA RRNA SPEC QL NAA+PROBE: NOT DETECTED
TRICHOMONAS VAGINALIS PCR: NOT DETECTED

## 2021-05-28 RX ORDER — FLUCONAZOLE 150 MG/1
150 TABLET ORAL ONCE
Qty: 2 TABLET | Refills: 0 | Status: SHIPPED | OUTPATIENT
Start: 2021-05-28 | End: 2021-05-28

## 2021-09-14 ENCOUNTER — INITIAL PRENATAL (OUTPATIENT)
Dept: OBSTETRICS AND GYNECOLOGY | Facility: CLINIC | Age: 26
End: 2021-09-14

## 2021-09-14 VITALS — BODY MASS INDEX: 21.03 KG/M2 | WEIGHT: 115 LBS | DIASTOLIC BLOOD PRESSURE: 58 MMHG | SYSTOLIC BLOOD PRESSURE: 100 MMHG

## 2021-09-14 DIAGNOSIS — Z34.91 PRENATAL CARE IN FIRST TRIMESTER: Primary | ICD-10-CM

## 2021-09-14 PROCEDURE — 0502F SUBSEQUENT PRENATAL CARE: CPT | Performed by: NURSE PRACTITIONER

## 2021-09-14 NOTE — PROGRESS NOTES
NOB visit - plan of care reviewed.  New OB info given.  Prenatal labs done.  G1, P0.  Takes Tums for occasional nausea but otherwise denies complaint.  ER precautions.

## 2021-09-16 LAB
ABO GROUP BLD: NORMAL
BACTERIA UR CULT: NORMAL
BACTERIA UR CULT: NORMAL
BASOPHILS # BLD AUTO: 0 X10E3/UL (ref 0–0.2)
BASOPHILS NFR BLD AUTO: 1 %
BLD GP AB SCN SERPL QL: NEGATIVE
EOSINOPHIL # BLD AUTO: 0.1 X10E3/UL (ref 0–0.4)
EOSINOPHIL NFR BLD AUTO: 2 %
ERYTHROCYTE [DISTWIDTH] IN BLOOD BY AUTOMATED COUNT: 13.2 % (ref 11.7–15.4)
HBV SURFACE AG SERPL QL IA: NEGATIVE
HCT VFR BLD AUTO: 39.9 % (ref 34–46.6)
HCV AB S/CO SERPL IA: 0.2 S/CO RATIO (ref 0–0.9)
HGB BLD-MCNC: 13.6 G/DL (ref 11.1–15.9)
HIV 1+2 AB+HIV1 P24 AG SERPL QL IA: NON REACTIVE
IMM GRANULOCYTES # BLD AUTO: 0 X10E3/UL (ref 0–0.1)
IMM GRANULOCYTES NFR BLD AUTO: 0 %
LYMPHOCYTES # BLD AUTO: 1.3 X10E3/UL (ref 0.7–3.1)
LYMPHOCYTES NFR BLD AUTO: 27 %
MCH RBC QN AUTO: 29.5 PG (ref 26.6–33)
MCHC RBC AUTO-ENTMCNC: 34.1 G/DL (ref 31.5–35.7)
MCV RBC AUTO: 87 FL (ref 79–97)
MONOCYTES # BLD AUTO: 0.3 X10E3/UL (ref 0.1–0.9)
MONOCYTES NFR BLD AUTO: 7 %
NEUTROPHILS # BLD AUTO: 3 X10E3/UL (ref 1.4–7)
NEUTROPHILS NFR BLD AUTO: 63 %
PLATELET # BLD AUTO: 181 X10E3/UL (ref 150–450)
RBC # BLD AUTO: 4.61 X10E6/UL (ref 3.77–5.28)
RH BLD: POSITIVE
RPR SER QL: NON REACTIVE
RUBV IGG SERPL IA-ACNC: 1.07 INDEX
WBC # BLD AUTO: 4.7 X10E3/UL (ref 3.4–10.8)

## 2021-10-15 ENCOUNTER — ROUTINE PRENATAL (OUTPATIENT)
Dept: OBSTETRICS AND GYNECOLOGY | Facility: CLINIC | Age: 26
End: 2021-10-15

## 2021-10-15 VITALS — SYSTOLIC BLOOD PRESSURE: 102 MMHG | DIASTOLIC BLOOD PRESSURE: 60 MMHG | WEIGHT: 118 LBS | BODY MASS INDEX: 21.58 KG/M2

## 2021-10-15 DIAGNOSIS — Z34.02 ENCOUNTER FOR SUPERVISION OF NORMAL FIRST PREGNANCY IN SECOND TRIMESTER: Primary | ICD-10-CM

## 2021-10-15 LAB
GLUCOSE UR STRIP-MCNC: NEGATIVE MG/DL
PROT UR STRIP-MCNC: ABNORMAL MG/DL

## 2021-10-15 PROCEDURE — 0502F SUBSEQUENT PRENATAL CARE: CPT | Performed by: OBSTETRICS & GYNECOLOGY

## 2021-10-15 NOTE — PROGRESS NOTES
"Feeling good.  Occasional cramping, \"like a period\", but no VB.  Some nausea, but does not want medication.  Nausea gets better if she eats crackers.  Trying to eat a healthy diet.  Covid vaccination encouraged and written information given.  Flu shot also encouraged  Gender u/s next week  "

## 2021-11-17 ENCOUNTER — ROUTINE PRENATAL (OUTPATIENT)
Dept: OBSTETRICS AND GYNECOLOGY | Facility: CLINIC | Age: 26
End: 2021-11-17

## 2021-11-17 VITALS — WEIGHT: 120 LBS | SYSTOLIC BLOOD PRESSURE: 100 MMHG | DIASTOLIC BLOOD PRESSURE: 60 MMHG | BODY MASS INDEX: 21.94 KG/M2

## 2021-11-17 DIAGNOSIS — Z34.02 ENCOUNTER FOR SUPERVISION OF NORMAL FIRST PREGNANCY IN SECOND TRIMESTER: Primary | ICD-10-CM

## 2021-11-17 LAB
GLUCOSE UR STRIP-MCNC: NEGATIVE MG/DL
PROT UR STRIP-MCNC: NEGATIVE MG/DL

## 2021-11-17 PROCEDURE — 0502F SUBSEQUENT PRENATAL CARE: CPT | Performed by: OBSTETRICS & GYNECOLOGY

## 2021-11-17 NOTE — PROGRESS NOTES
" Hungry, but otherwise feeling fine.  Nausea now almost gone.  No cramping or VB.  Feeling \"a little\" movement now.  [facility] u/s is next week.  "

## 2021-12-14 ENCOUNTER — ROUTINE PRENATAL (OUTPATIENT)
Dept: OBSTETRICS AND GYNECOLOGY | Facility: CLINIC | Age: 26
End: 2021-12-14

## 2021-12-14 VITALS — SYSTOLIC BLOOD PRESSURE: 98 MMHG | WEIGHT: 132 LBS | DIASTOLIC BLOOD PRESSURE: 62 MMHG | BODY MASS INDEX: 24.14 KG/M2

## 2021-12-14 DIAGNOSIS — Z34.02 ENCOUNTER FOR SUPERVISION OF NORMAL FIRST PREGNANCY IN SECOND TRIMESTER: Primary | ICD-10-CM

## 2021-12-14 LAB
GLUCOSE UR STRIP-MCNC: NEGATIVE MG/DL
PROT UR STRIP-MCNC: NEGATIVE MG/DL

## 2021-12-14 PROCEDURE — 0502F SUBSEQUENT PRENATAL CARE: CPT | Performed by: OBSTETRICS & GYNECOLOGY

## 2021-12-14 NOTE — PROGRESS NOTES
Feeling good.  Patient reports that her hand was numb on Sunday; discussed Carpel Tunnel.  No cramping or hurting.  + FM now.  No LOF or VB.  GCT and TDaP at next visit.  Instructions given.

## 2022-01-12 ENCOUNTER — ROUTINE PRENATAL (OUTPATIENT)
Dept: OBSTETRICS AND GYNECOLOGY | Facility: CLINIC | Age: 27
End: 2022-01-12

## 2022-01-12 VITALS — WEIGHT: 133 LBS | BODY MASS INDEX: 24.32 KG/M2 | SYSTOLIC BLOOD PRESSURE: 98 MMHG | DIASTOLIC BLOOD PRESSURE: 62 MMHG

## 2022-01-12 DIAGNOSIS — Z34.02 ENCOUNTER FOR SUPERVISION OF NORMAL FIRST PREGNANCY IN SECOND TRIMESTER: Primary | ICD-10-CM

## 2022-01-12 DIAGNOSIS — Z78.9 NONSMOKER: ICD-10-CM

## 2022-01-12 DIAGNOSIS — Z34.91 PRENATAL CARE IN FIRST TRIMESTER: ICD-10-CM

## 2022-01-12 LAB
GLUCOSE UR STRIP-MCNC: NEGATIVE MG/DL
PROT UR STRIP-MCNC: NEGATIVE MG/DL

## 2022-01-12 PROCEDURE — 90471 IMMUNIZATION ADMIN: CPT | Performed by: OBSTETRICS & GYNECOLOGY

## 2022-01-12 PROCEDURE — 90715 TDAP VACCINE 7 YRS/> IM: CPT | Performed by: OBSTETRICS & GYNECOLOGY

## 2022-01-12 PROCEDURE — 0502F SUBSEQUENT PRENATAL CARE: CPT | Performed by: OBSTETRICS & GYNECOLOGY

## 2022-01-12 NOTE — PROGRESS NOTES
Feeling good.  Heartburn is only complaint - TUMS or Pepcid AC both fine.  No wrist pain right now.  No cramping, no LOF and no VB.  Good FM  GCT and TDaP today.  4D u/s

## 2022-01-13 LAB
GLUCOSE 1H P 50 G GLC PO SERPL-MCNC: 127 MG/DL (ref 65–139)
HGB BLD-MCNC: 11.8 G/DL (ref 12–15.9)

## 2022-01-26 ENCOUNTER — ROUTINE PRENATAL (OUTPATIENT)
Dept: OBSTETRICS AND GYNECOLOGY | Facility: CLINIC | Age: 27
End: 2022-01-26

## 2022-01-26 VITALS — WEIGHT: 136 LBS | DIASTOLIC BLOOD PRESSURE: 60 MMHG | SYSTOLIC BLOOD PRESSURE: 102 MMHG | BODY MASS INDEX: 24.87 KG/M2

## 2022-01-26 DIAGNOSIS — Z78.9 NONSMOKER: ICD-10-CM

## 2022-01-26 DIAGNOSIS — Z34.03 ENCOUNTER FOR SUPERVISION OF NORMAL FIRST PREGNANCY IN THIRD TRIMESTER: Primary | ICD-10-CM

## 2022-01-26 LAB
GLUCOSE UR STRIP-MCNC: NEGATIVE MG/DL
PROT UR STRIP-MCNC: NEGATIVE MG/DL

## 2022-01-26 PROCEDURE — 0502F SUBSEQUENT PRENATAL CARE: CPT | Performed by: OBSTETRICS & GYNECOLOGY

## 2022-01-26 NOTE — PROGRESS NOTES
Some HA's, which improve with tylenol.  Patient also having tingling in hands again, again encouraged her to buy wrist braces.  No contractions or abdominal pain.  No LOF or VB.  Good FM.  GCT was 127  No questions or concerns.

## 2022-02-09 ENCOUNTER — PATIENT MESSAGE (OUTPATIENT)
Dept: OBSTETRICS AND GYNECOLOGY | Facility: CLINIC | Age: 27
End: 2022-02-09

## 2022-02-09 NOTE — TELEPHONE ENCOUNTER
Contacted patient again. She has drank one full bottle of water and taken tums. Patient reports heart burn has subsided and that highest heart rate has been was 90. Patient was starting on her second bottle of water prior to me calling her. Patient encouraged to continue with increased fluid intake and inform us should symptoms worsen, as well as keep appointment tomorrow. Patient voices understanding.

## 2022-02-09 NOTE — TELEPHONE ENCOUNTER
Contacted patient by phone. Patient denies any chest pain. Mild shortness of breath when she gets up and moving and heart burn. Patient denies heart rate getting any higher than the 105. Patient states baby is moving like normal. Patient reports not being well hydrated this morning. Patient encouraged to increase water intake over the next hour as well as take some tums to help with heart burn. Informed her to monitor her symptoms and should anything change to call me back. Told patient that I would call her in about an hour to check on her to see how she is doing.

## 2022-02-10 ENCOUNTER — ROUTINE PRENATAL (OUTPATIENT)
Dept: OBSTETRICS AND GYNECOLOGY | Facility: CLINIC | Age: 27
End: 2022-02-10

## 2022-02-10 VITALS — WEIGHT: 135 LBS | BODY MASS INDEX: 24.69 KG/M2 | DIASTOLIC BLOOD PRESSURE: 70 MMHG | SYSTOLIC BLOOD PRESSURE: 104 MMHG

## 2022-02-10 DIAGNOSIS — Z34.03 ENCOUNTER FOR SUPERVISION OF NORMAL FIRST PREGNANCY IN THIRD TRIMESTER: Primary | ICD-10-CM

## 2022-02-10 DIAGNOSIS — Z78.9 NONSMOKER: ICD-10-CM

## 2022-02-10 LAB
GLUCOSE UR STRIP-MCNC: NEGATIVE MG/DL
PROT UR STRIP-MCNC: NEGATIVE MG/DL

## 2022-02-10 PROCEDURE — 0502F SUBSEQUENT PRENATAL CARE: CPT | Performed by: OBSTETRICS & GYNECOLOGY

## 2022-02-23 ENCOUNTER — ROUTINE PRENATAL (OUTPATIENT)
Dept: OBSTETRICS AND GYNECOLOGY | Facility: CLINIC | Age: 27
End: 2022-02-23

## 2022-02-23 VITALS — BODY MASS INDEX: 25.23 KG/M2 | WEIGHT: 138 LBS | DIASTOLIC BLOOD PRESSURE: 70 MMHG | SYSTOLIC BLOOD PRESSURE: 108 MMHG

## 2022-02-23 DIAGNOSIS — Z78.9 NONSMOKER: ICD-10-CM

## 2022-02-23 DIAGNOSIS — Z34.03 ENCOUNTER FOR SUPERVISION OF NORMAL FIRST PREGNANCY IN THIRD TRIMESTER: Primary | ICD-10-CM

## 2022-02-23 LAB
GLUCOSE UR STRIP-MCNC: NEGATIVE MG/DL
PROT UR STRIP-MCNC: NEGATIVE MG/DL

## 2022-02-23 PROCEDURE — 0502F SUBSEQUENT PRENATAL CARE: CPT | Performed by: OBSTETRICS & GYNECOLOGY

## 2022-02-23 NOTE — PROGRESS NOTES
Feeling good.  Uncomfortable in her ribs, but no pelvic pain.  No LOF or VB  Good FM.  Patient reports intermittent trace edema in ankles

## 2022-03-09 ENCOUNTER — ROUTINE PRENATAL (OUTPATIENT)
Dept: OBSTETRICS AND GYNECOLOGY | Facility: CLINIC | Age: 27
End: 2022-03-09

## 2022-03-09 VITALS — SYSTOLIC BLOOD PRESSURE: 106 MMHG | DIASTOLIC BLOOD PRESSURE: 68 MMHG | BODY MASS INDEX: 25.23 KG/M2 | WEIGHT: 138 LBS

## 2022-03-09 DIAGNOSIS — Z34.03 ENCOUNTER FOR SUPERVISION OF NORMAL FIRST PREGNANCY IN THIRD TRIMESTER: Primary | ICD-10-CM

## 2022-03-09 DIAGNOSIS — Z78.9 NONSMOKER: ICD-10-CM

## 2022-03-09 LAB
GLUCOSE UR STRIP-MCNC: NEGATIVE MG/DL
PROT UR STRIP-MCNC: NEGATIVE MG/DL

## 2022-03-09 PROCEDURE — 0502F SUBSEQUENT PRENATAL CARE: CPT | Performed by: OBSTETRICS & GYNECOLOGY

## 2022-03-09 NOTE — PROGRESS NOTES
Feeling ok, baby is still up in her ribs  No contractions.  No LOF or VB  Good FM  Pepcid for GERD  Cultures next week

## 2022-03-15 ENCOUNTER — HOSPITAL ENCOUNTER (OUTPATIENT)
Facility: HOSPITAL | Age: 27
Discharge: HOME | End: 2022-03-15
Attending: OBSTETRICS & GYNECOLOGY | Admitting: OBSTETRICS & GYNECOLOGY

## 2022-03-15 ENCOUNTER — TELEPHONE (OUTPATIENT)
Dept: OBSTETRICS AND GYNECOLOGY | Facility: CLINIC | Age: 27
End: 2022-03-15

## 2022-03-15 ENCOUNTER — HOSPITAL ENCOUNTER (OUTPATIENT)
Facility: HOSPITAL | Age: 27
End: 2022-03-15
Attending: OBSTETRICS & GYNECOLOGY | Admitting: OBSTETRICS & GYNECOLOGY

## 2022-03-15 VITALS
RESPIRATION RATE: 18 BRPM | HEIGHT: 62 IN | HEART RATE: 82 BPM | OXYGEN SATURATION: 100 % | SYSTOLIC BLOOD PRESSURE: 101 MMHG | TEMPERATURE: 97.9 F | DIASTOLIC BLOOD PRESSURE: 62 MMHG | BODY MASS INDEX: 25.95 KG/M2 | WEIGHT: 141 LBS

## 2022-03-15 LAB
ALBUMIN SERPL-MCNC: 3.7 G/DL (ref 3.5–5.2)
ALBUMIN/GLOB SERPL: 1.1 G/DL
ALP SERPL-CCNC: 175 U/L (ref 39–117)
ALT SERPL W P-5'-P-CCNC: 17 U/L (ref 1–33)
ANION GAP SERPL CALCULATED.3IONS-SCNC: 8 MMOL/L (ref 5–15)
AST SERPL-CCNC: 31 U/L (ref 1–32)
BASOPHILS # BLD AUTO: 0.02 10*3/MM3 (ref 0–0.2)
BASOPHILS NFR BLD AUTO: 0.3 % (ref 0–1.5)
BILIRUB SERPL-MCNC: 0.4 MG/DL (ref 0–1.2)
BUN SERPL-MCNC: 7 MG/DL (ref 6–20)
BUN/CREAT SERPL: 13.5 (ref 7–25)
CALCIUM SPEC-SCNC: 9.3 MG/DL (ref 8.6–10.5)
CHLORIDE SERPL-SCNC: 106 MMOL/L (ref 98–107)
CO2 SERPL-SCNC: 22 MMOL/L (ref 22–29)
CREAT SERPL-MCNC: 0.52 MG/DL (ref 0.57–1)
DEPRECATED RDW RBC AUTO: 40.6 FL (ref 37–54)
EGFRCR SERPLBLD CKD-EPI 2021: 131.6 ML/MIN/1.73
EOSINOPHIL # BLD AUTO: 0.04 10*3/MM3 (ref 0–0.4)
EOSINOPHIL NFR BLD AUTO: 0.6 % (ref 0.3–6.2)
ERYTHROCYTE [DISTWIDTH] IN BLOOD BY AUTOMATED COUNT: 13 % (ref 12.3–15.4)
GLOBULIN UR ELPH-MCNC: 3.3 GM/DL
GLUCOSE SERPL-MCNC: 77 MG/DL (ref 65–99)
HCT VFR BLD AUTO: 36.5 % (ref 34–46.6)
HGB BLD-MCNC: 12.3 G/DL (ref 12–15.9)
IMM GRANULOCYTES # BLD AUTO: 0.03 10*3/MM3 (ref 0–0.05)
IMM GRANULOCYTES NFR BLD AUTO: 0.4 % (ref 0–0.5)
LDH SERPL-CCNC: 211 U/L (ref 135–214)
LYMPHOCYTES # BLD AUTO: 1.3 10*3/MM3 (ref 0.7–3.1)
LYMPHOCYTES NFR BLD AUTO: 18.4 % (ref 19.6–45.3)
MCH RBC QN AUTO: 29.6 PG (ref 26.6–33)
MCHC RBC AUTO-ENTMCNC: 33.7 G/DL (ref 31.5–35.7)
MCV RBC AUTO: 88 FL (ref 79–97)
MONOCYTES # BLD AUTO: 0.48 10*3/MM3 (ref 0.1–0.9)
MONOCYTES NFR BLD AUTO: 6.8 % (ref 5–12)
NEUTROPHILS NFR BLD AUTO: 5.21 10*3/MM3 (ref 1.7–7)
NEUTROPHILS NFR BLD AUTO: 73.5 % (ref 42.7–76)
NRBC BLD AUTO-RTO: 0 /100 WBC (ref 0–0.2)
PLATELET # BLD AUTO: 146 10*3/MM3 (ref 140–450)
PMV BLD AUTO: 11.8 FL (ref 6–12)
POTASSIUM SERPL-SCNC: 4.3 MMOL/L (ref 3.5–5.2)
PROT SERPL-MCNC: 7 G/DL (ref 6–8.5)
RBC # BLD AUTO: 4.15 10*6/MM3 (ref 3.77–5.28)
SODIUM SERPL-SCNC: 136 MMOL/L (ref 136–145)
URATE SERPL-MCNC: 3.8 MG/DL (ref 2.4–5.7)
WBC NRBC COR # BLD: 7.08 10*3/MM3 (ref 3.4–10.8)

## 2022-03-15 PROCEDURE — 80053 COMPREHEN METABOLIC PANEL: CPT | Performed by: OBSTETRICS & GYNECOLOGY

## 2022-03-15 PROCEDURE — 36415 COLL VENOUS BLD VENIPUNCTURE: CPT | Performed by: OBSTETRICS & GYNECOLOGY

## 2022-03-15 PROCEDURE — G0378 HOSPITAL OBSERVATION PER HR: HCPCS

## 2022-03-15 PROCEDURE — 85025 COMPLETE CBC W/AUTO DIFF WBC: CPT | Performed by: OBSTETRICS & GYNECOLOGY

## 2022-03-15 PROCEDURE — 84550 ASSAY OF BLOOD/URIC ACID: CPT | Performed by: OBSTETRICS & GYNECOLOGY

## 2022-03-15 PROCEDURE — G0463 HOSPITAL OUTPT CLINIC VISIT: HCPCS

## 2022-03-15 PROCEDURE — 83615 LACTATE (LD) (LDH) ENZYME: CPT | Performed by: OBSTETRICS & GYNECOLOGY

## 2022-03-15 RX ORDER — BUTALBITAL, ACETAMINOPHEN AND CAFFEINE 50; 325; 40 MG/1; MG/1; MG/1
1 TABLET ORAL ONCE
Status: COMPLETED | OUTPATIENT
Start: 2022-03-15 | End: 2022-03-15

## 2022-03-15 RX ADMIN — BUTALBITAL, ACETAMINOPHEN AND CAFFEINE 1 TABLET: 50; 325; 40 TABLET ORAL at 14:18

## 2022-03-15 NOTE — NURSING NOTE
Patient arrived to LDR with complaints of headache.    Adama Ko RN    
Patient educated on reasons to return to labor and delivery including increased frequency and strength of contractions, sudden gush/leaking of fluid, vaginal bleeding, decreased fetal movement, blurry vision/spots before eyes, epigastric pain, and headache unrelieved with Tylenol.  
+left middle finger pain with extension and flexion

## 2022-03-15 NOTE — TELEPHONE ENCOUNTER
Pt sent [employer] message. Called and spoke with pt 36w2d having numbness in left hand and face, headache, and blurry vision. Pt denies vaginal leaking or bleeding. Good fetal movement. Pt advised to go to LDR for evaluation. Pt understood. BS

## 2022-03-16 ENCOUNTER — ROUTINE PRENATAL (OUTPATIENT)
Dept: OBSTETRICS AND GYNECOLOGY | Facility: CLINIC | Age: 27
End: 2022-03-16

## 2022-03-16 ENCOUNTER — TELEPHONE (OUTPATIENT)
Dept: FAMILY MEDICINE CLINIC | Facility: CLINIC | Age: 27
End: 2022-03-16

## 2022-03-16 VITALS — WEIGHT: 138 LBS | BODY MASS INDEX: 25.24 KG/M2 | DIASTOLIC BLOOD PRESSURE: 62 MMHG | SYSTOLIC BLOOD PRESSURE: 98 MMHG

## 2022-03-16 DIAGNOSIS — B37.31 CANDIDIASIS, VAGINA: ICD-10-CM

## 2022-03-16 DIAGNOSIS — Z34.03 ENCOUNTER FOR SUPERVISION OF NORMAL FIRST PREGNANCY IN THIRD TRIMESTER: Primary | ICD-10-CM

## 2022-03-16 PROBLEM — Z34.90 PREGNANCY: Status: ACTIVE | Noted: 2022-03-16

## 2022-03-16 LAB
GLUCOSE UR STRIP-MCNC: NEGATIVE MG/DL
PROT UR STRIP-MCNC: NEGATIVE MG/DL

## 2022-03-16 PROCEDURE — 0502F SUBSEQUENT PRENATAL CARE: CPT | Performed by: OBSTETRICS & GYNECOLOGY

## 2022-03-16 RX ORDER — FLUCONAZOLE 150 MG/1
150 TABLET ORAL DAILY
Qty: 1 TABLET | Refills: 0 | Status: SHIPPED | OUTPATIENT
Start: 2022-03-16 | End: 2022-03-23

## 2022-03-16 NOTE — PROGRESS NOTES
Patient having some intermittent cramping and pelvic pressure.  + discharge that she says is new.  No VB  Good FM  Cultures collected.  + yeast on exam; sending Diflucan

## 2022-03-18 LAB
C TRACH RRNA SPEC QL NAA+PROBE: NEGATIVE
GP B STREP DNA SPEC QL NAA+PROBE: NEGATIVE
N GONORRHOEA RRNA SPEC QL NAA+PROBE: NEGATIVE

## 2022-03-23 ENCOUNTER — ROUTINE PRENATAL (OUTPATIENT)
Dept: OBSTETRICS AND GYNECOLOGY | Facility: CLINIC | Age: 27
End: 2022-03-23

## 2022-03-23 VITALS — SYSTOLIC BLOOD PRESSURE: 106 MMHG | BODY MASS INDEX: 25.79 KG/M2 | WEIGHT: 141 LBS | DIASTOLIC BLOOD PRESSURE: 70 MMHG

## 2022-03-23 DIAGNOSIS — Z78.9 NONSMOKER: ICD-10-CM

## 2022-03-23 DIAGNOSIS — Z34.03 ENCOUNTER FOR SUPERVISION OF NORMAL FIRST PREGNANCY IN THIRD TRIMESTER: Primary | ICD-10-CM

## 2022-03-23 LAB
GLUCOSE UR STRIP-MCNC: NEGATIVE MG/DL
PROT UR STRIP-MCNC: NEGATIVE MG/DL

## 2022-03-23 PROCEDURE — 0502F SUBSEQUENT PRENATAL CARE: CPT | Performed by: NURSE PRACTITIONER

## 2022-03-23 NOTE — PROGRESS NOTES
No further vaginal discharge.   Cramping and pressure intermittent.     Reviewed fetal movement counts and encouraged, pt to report decreased fetal movements.    Pt reports good fetal movements and voices no concerns.   Pt denies UC's, pelvic pain, vaginal bleeding, leaking of fluid, HA, visual disturbances and epigastric pain.  Discussed plan of care and S&S to report.

## 2022-03-23 NOTE — PATIENT INSTRUCTIONS
"https://www.nichd.nih.gov/health/topics/labor-delivery/Pages/default.aspx\">   Third Trimester of Pregnancy    The third trimester of pregnancy is from week 28 through week 40. This is months 7 through 9. The third trimester is a time when the unborn baby (fetus) is growing rapidly. At the end of the ninth month, the fetus is about 20 inches long and weighs 6-10 pounds.  Body changes during your third trimester  During the third trimester, your body will continue to go through many changes. The changes vary and generally return to normal after your baby is born.  Physical changes  Your weight will continue to increase. You can expect to gain 25-35 pounds (11-16 kg) by the end of the pregnancy if you begin pregnancy at a normal weight. If you are underweight, you can expect to gain 28-40 lb (about 13-18 kg), and if you are overweight, you can expect to gain 15-25 lb (about 7-11 kg).  You may begin to get stretch marks on your hips, abdomen, and breasts.  Your breasts will continue to grow and may hurt. A yellow fluid (colostrum) may leak from your breasts. This is the first milk you are producing for your baby.  You may have changes in your hair. These can include thickening of your hair, rapid growth, and changes in texture. Some people also have hair loss during or after pregnancy, or hair that feels dry or thin.  Your belly button may stick out.  You may notice more swelling in your hands, face, or ankles.  Health changes  You may have heartburn.  You may have constipation.  You may develop hemorrhoids.  You may develop swollen, bulging veins (varicose veins) in your legs.  You may have increased body aches in the pelvis, back, or thighs. This is due to weight gain and increased hormones that are relaxing your joints.  You may have increased tingling or numbness in your hands, arms, and legs. The skin on your abdomen may also feel numb.  You may feel short of breath because of your expanding uterus.  Other " "changes  You may urinate more often because the fetus is moving lower into your pelvis and pressing on your bladder.  You may have more problems sleeping. This may be caused by the size of your abdomen, an increased need to urinate, and an increase in your body's metabolism.  You may notice the fetus \"dropping,\" or moving lower in your abdomen (lightening).  You may have increased vaginal discharge.  You may notice that you have pain around your pelvic bone as your uterus distends.  Follow these instructions at home:  Medicines  Follow your health care provider's instructions regarding medicine use. Specific medicines may be either safe or unsafe to take during pregnancy. Do not take any medicines unless approved by your health care provider.  Take a prenatal vitamin that contains at least 600 micrograms (mcg) of folic acid.  Eating and drinking  Eat a healthy diet that includes fresh fruits and vegetables, whole grains, good sources of protein such as meat, eggs, or tofu, and low-fat dairy products.  Avoid raw meat and unpasteurized juice, milk, and cheese. These carry germs that can harm you and your baby.  Eat 4 or 5 small meals rather than 3 large meals a day.  You may need to take these actions to prevent or treat constipation:  Drink enough fluid to keep your urine pale yellow.  Eat foods that are high in fiber, such as beans, whole grains, and fresh fruits and vegetables.  Limit foods that are high in fat and processed sugars, such as fried or sweet foods.  Activity  Exercise only as directed by your health care provider. Most people can continue their usual exercise routine during pregnancy. Try to exercise for 30 minutes at least 5 days a week. Stop exercising if you experience contractions in the uterus.  Stop exercising if you develop pain or cramping in the lower abdomen or lower back.  Avoid heavy lifting.  Do not exercise if it is very hot or humid or if you are at a high altitude.  If you choose to, " you may continue to have sex unless your health care provider tells you not to.  Relieving pain and discomfort  Take frequent breaks and rest with your legs raised (elevated) if you have leg cramps or low back pain.  Take warm sitz baths to soothe any pain or discomfort caused by hemorrhoids. Use hemorrhoid cream if your health care provider approves.  Wear a supportive bra to prevent discomfort from breast tenderness.  If you develop varicose veins:  Wear support hose as told by your health care provider.  Elevate your feet for 15 minutes, 3-4 times a day.  Limit salt in your diet.  Safety  Talk to your health care provider before traveling far distances.  Do not use hot tubs, steam rooms, or saunas.  Wear your seat belt at all times when driving or riding in a car.  Talk with your health care provider if someone is verbally or physically abusive to you.  Preparing for birth  To prepare for the arrival of your baby:  Take prenatal classes to understand, practice, and ask questions about labor and delivery.  Visit the hospital and tour the maternity area.  Purchase a rear-facing car seat and make sure you know how to install it in your car.  Prepare the baby's room or sleeping area. Make sure to remove all pillows and stuffed animals from the baby's crib to prevent suffocation.  General instructions  Avoid cat litter boxes and soil used by cats. These carry germs that can cause birth defects in the baby. If you have a cat, ask someone to clean the litter box for you.  Do not douche or use tampons. Do not use scented sanitary pads.  Do not use any products that contain nicotine or tobacco, such as cigarettes, e-cigarettes, and chewing tobacco. If you need help quitting, ask your health care provider.  Do not use any herbal remedies, illegal drugs, or medicines that were not prescribed to you. Chemicals in these products can harm your baby.  Do not drink alcohol.  You will have more frequent prenatal exams during the  third trimester. During a routine prenatal visit, your health care provider will do a physical exam, perform tests, and discuss your overall health. Keep all follow-up visits. This is important.  Where to find more information  American Pregnancy Association: americanpregnancy.org  American College of Obstetricians and Gynecologists: acog.org/en/Womens%20Health/Pregnancy  Office on Women's Health: womenshealth.gov/pregnancy  Contact a health care provider if you have:  A fever.  Mild pelvic cramps, pelvic pressure, or nagging pain in your abdominal area or lower back.  Vomiting or diarrhea.  Bad-smelling vaginal discharge or foul-smelling urine.  Pain when you urinate.  A headache that does not go away when you take medicine.  Visual changes or see spots in front of your eyes.  Get help right away if:  Your water breaks.  You have regular contractions less than 5 minutes apart.  You have spotting or bleeding from your vagina.  You have severe abdominal pain.  You have difficulty breathing.  You have chest pain.  You have fainting spells.  You have not felt your baby move for the time period told by your health care provider.  You have new or increased pain, swelling, or redness in an arm or leg.  Summary  The third trimester of pregnancy is from week 28 through week 40 (months 7 through 9).  You may have more problems sleeping. This can be caused by the size of your abdomen, an increased need to urinate, and an increase in your body's metabolism.  You will have more frequent prenatal exams during the third trimester. Keep all follow-up visits. This is important.  This information is not intended to replace advice given to you by your health care provider. Make sure you discuss any questions you have with your health care provider.  Document Revised: 05/26/2021 Document Reviewed: 04/01/2021  Elsevier Patient Education © 2021 [employer]vier Inc.

## 2022-03-30 ENCOUNTER — ROUTINE PRENATAL (OUTPATIENT)
Dept: OBSTETRICS AND GYNECOLOGY | Facility: CLINIC | Age: 27
End: 2022-03-30

## 2022-03-30 VITALS — DIASTOLIC BLOOD PRESSURE: 68 MMHG | SYSTOLIC BLOOD PRESSURE: 106 MMHG | WEIGHT: 141 LBS | BODY MASS INDEX: 25.79 KG/M2

## 2022-03-30 DIAGNOSIS — Z34.03 ENCOUNTER FOR SUPERVISION OF NORMAL FIRST PREGNANCY IN THIRD TRIMESTER: Primary | ICD-10-CM

## 2022-03-30 DIAGNOSIS — Z78.9 NONSMOKER: ICD-10-CM

## 2022-03-30 LAB
GLUCOSE UR STRIP-MCNC: NEGATIVE MG/DL
PROT UR STRIP-MCNC: NEGATIVE MG/DL

## 2022-03-30 PROCEDURE — 0502F SUBSEQUENT PRENATAL CARE: CPT | Performed by: OBSTETRICS & GYNECOLOGY

## 2022-04-03 ENCOUNTER — ANESTHESIA (OUTPATIENT)
Dept: LABOR AND DELIVERY | Facility: HOSPITAL | Age: 27
End: 2022-04-03

## 2022-04-03 ENCOUNTER — ANESTHESIA EVENT (OUTPATIENT)
Dept: LABOR AND DELIVERY | Facility: HOSPITAL | Age: 27
End: 2022-04-03

## 2022-04-03 ENCOUNTER — HOSPITAL ENCOUNTER (INPATIENT)
Facility: HOSPITAL | Age: 27
LOS: 2 days | Discharge: HOME | End: 2022-04-05
Attending: OBSTETRICS & GYNECOLOGY | Admitting: OBSTETRICS & GYNECOLOGY

## 2022-04-03 PROBLEM — Z34.90 PREGNANCY: Status: RESOLVED | Noted: 2022-03-16 | Resolved: 2022-04-03

## 2022-04-03 LAB
ABO GROUP BLD: NORMAL
BASOPHILS # BLD AUTO: 0.03 10*3/MM3 (ref 0–0.2)
BASOPHILS NFR BLD AUTO: 0.3 % (ref 0–1.5)
BLD GP AB SCN SERPL QL: NEGATIVE
DEPRECATED RDW RBC AUTO: 40.2 FL (ref 37–54)
EOSINOPHIL # BLD AUTO: 0.02 10*3/MM3 (ref 0–0.4)
EOSINOPHIL NFR BLD AUTO: 0.2 % (ref 0.3–6.2)
ERYTHROCYTE [DISTWIDTH] IN BLOOD BY AUTOMATED COUNT: 13.2 % (ref 12.3–15.4)
HCT VFR BLD AUTO: 39.1 % (ref 34–46.6)
HGB BLD-MCNC: 13.1 G/DL (ref 12–15.9)
IMM GRANULOCYTES # BLD AUTO: 0.07 10*3/MM3 (ref 0–0.05)
IMM GRANULOCYTES NFR BLD AUTO: 0.6 % (ref 0–0.5)
LYMPHOCYTES # BLD AUTO: 1.33 10*3/MM3 (ref 0.7–3.1)
LYMPHOCYTES NFR BLD AUTO: 11.2 % (ref 19.6–45.3)
MCH RBC QN AUTO: 29 PG (ref 26.6–33)
MCHC RBC AUTO-ENTMCNC: 33.5 G/DL (ref 31.5–35.7)
MCV RBC AUTO: 86.5 FL (ref 79–97)
MONOCYTES # BLD AUTO: 0.59 10*3/MM3 (ref 0.1–0.9)
MONOCYTES NFR BLD AUTO: 5 % (ref 5–12)
NEUTROPHILS NFR BLD AUTO: 82.7 % (ref 42.7–76)
NEUTROPHILS NFR BLD AUTO: 9.82 10*3/MM3 (ref 1.7–7)
NRBC BLD AUTO-RTO: 0 /100 WBC (ref 0–0.2)
PLATELET # BLD AUTO: 158 10*3/MM3 (ref 140–450)
PMV BLD AUTO: 12.2 FL (ref 6–12)
RBC # BLD AUTO: 4.52 10*6/MM3 (ref 3.77–5.28)
RH BLD: POSITIVE
SARS-COV-2 RNA PNL SPEC NAA+PROBE: NOT DETECTED
T&S EXPIRATION DATE: NORMAL
WBC NRBC COR # BLD: 11.86 10*3/MM3 (ref 3.4–10.8)

## 2022-04-03 PROCEDURE — C1755 CATHETER, INTRASPINAL: HCPCS | Performed by: NURSE ANESTHETIST, CERTIFIED REGISTERED

## 2022-04-03 PROCEDURE — 25010000002 BUTORPHANOL PER 1 MG: Performed by: OBSTETRICS & GYNECOLOGY

## 2022-04-03 PROCEDURE — 87635 SARS-COV-2 COVID-19 AMP PRB: CPT | Performed by: OBSTETRICS & GYNECOLOGY

## 2022-04-03 PROCEDURE — 0UQMXZZ REPAIR VULVA, EXTERNAL APPROACH: ICD-10-PCS | Performed by: OBSTETRICS & GYNECOLOGY

## 2022-04-03 PROCEDURE — 86901 BLOOD TYPING SEROLOGIC RH(D): CPT | Performed by: OBSTETRICS & GYNECOLOGY

## 2022-04-03 PROCEDURE — 10907ZC DRAINAGE OF AMNIOTIC FLUID, THERAPEUTIC FROM PRODUCTS OF CONCEPTION, VIA NATURAL OR ARTIFICIAL OPENING: ICD-10-PCS | Performed by: OBSTETRICS & GYNECOLOGY

## 2022-04-03 PROCEDURE — 51702 INSERT TEMP BLADDER CATH: CPT

## 2022-04-03 PROCEDURE — 85025 COMPLETE CBC W/AUTO DIFF WBC: CPT | Performed by: OBSTETRICS & GYNECOLOGY

## 2022-04-03 PROCEDURE — 25010000002 ROPIVACAINE PER 1 MG: Performed by: NURSE ANESTHETIST, CERTIFIED REGISTERED

## 2022-04-03 PROCEDURE — 86850 RBC ANTIBODY SCREEN: CPT | Performed by: OBSTETRICS & GYNECOLOGY

## 2022-04-03 PROCEDURE — 59400 OBSTETRICAL CARE: CPT | Performed by: OBSTETRICS & GYNECOLOGY

## 2022-04-03 PROCEDURE — 86900 BLOOD TYPING SEROLOGIC ABO: CPT | Performed by: OBSTETRICS & GYNECOLOGY

## 2022-04-03 RX ORDER — FAMOTIDINE 10 MG/ML
20 INJECTION, SOLUTION INTRAVENOUS ONCE AS NEEDED
Status: DISCONTINUED | OUTPATIENT
Start: 2022-04-03 | End: 2022-04-05 | Stop reason: HOSPADM

## 2022-04-03 RX ORDER — TRISODIUM CITRATE DIHYDRATE AND CITRIC ACID MONOHYDRATE 500; 334 MG/5ML; MG/5ML
30 SOLUTION ORAL ONCE
Status: DISCONTINUED | OUTPATIENT
Start: 2022-04-03 | End: 2022-04-03 | Stop reason: HOSPADM

## 2022-04-03 RX ORDER — METHYLERGONOVINE MALEATE 0.2 MG/ML
200 INJECTION INTRAVENOUS ONCE AS NEEDED
Status: DISCONTINUED | OUTPATIENT
Start: 2022-04-03 | End: 2022-04-03 | Stop reason: HOSPADM

## 2022-04-03 RX ORDER — PRENATAL VIT/IRON FUM/FOLIC AC 27MG-0.8MG
1 TABLET ORAL DAILY
Status: DISCONTINUED | OUTPATIENT
Start: 2022-04-03 | End: 2022-04-05 | Stop reason: HOSPADM

## 2022-04-03 RX ORDER — LIDOCAINE HYDROCHLORIDE AND EPINEPHRINE 15; 5 MG/ML; UG/ML
INJECTION, SOLUTION EPIDURAL AS NEEDED
Status: DISCONTINUED | OUTPATIENT
Start: 2022-04-03 | End: 2022-04-03 | Stop reason: SURG

## 2022-04-03 RX ORDER — ONDANSETRON 4 MG/1
4 TABLET, FILM COATED ORAL EVERY 6 HOURS PRN
Status: DISCONTINUED | OUTPATIENT
Start: 2022-04-03 | End: 2022-04-03 | Stop reason: HOSPADM

## 2022-04-03 RX ORDER — IBUPROFEN 600 MG/1
600 TABLET ORAL EVERY 6 HOURS PRN
Status: CANCELLED | OUTPATIENT
Start: 2022-04-03

## 2022-04-03 RX ORDER — BUTORPHANOL TARTRATE 1 MG/ML
2 INJECTION, SOLUTION INTRAMUSCULAR; INTRAVENOUS
Status: DISCONTINUED | OUTPATIENT
Start: 2022-04-03 | End: 2022-04-03 | Stop reason: HOSPADM

## 2022-04-03 RX ORDER — NALOXONE HCL 0.4 MG/ML
0.4 VIAL (ML) INJECTION
Status: DISCONTINUED | OUTPATIENT
Start: 2022-04-03 | End: 2022-04-05 | Stop reason: HOSPADM

## 2022-04-03 RX ORDER — SODIUM CHLORIDE, SODIUM LACTATE, POTASSIUM CHLORIDE, CALCIUM CHLORIDE 600; 310; 30; 20 MG/100ML; MG/100ML; MG/100ML; MG/100ML
125 INJECTION, SOLUTION INTRAVENOUS CONTINUOUS
Status: DISCONTINUED | OUTPATIENT
Start: 2022-04-03 | End: 2022-04-03

## 2022-04-03 RX ORDER — MISOPROSTOL 200 UG/1
800 TABLET ORAL AS NEEDED
Status: DISCONTINUED | OUTPATIENT
Start: 2022-04-03 | End: 2022-04-03 | Stop reason: HOSPADM

## 2022-04-03 RX ORDER — SODIUM CHLORIDE 0.9 % (FLUSH) 0.9 %
10 SYRINGE (ML) INJECTION EVERY 12 HOURS SCHEDULED
Status: DISCONTINUED | OUTPATIENT
Start: 2022-04-03 | End: 2022-04-03 | Stop reason: HOSPADM

## 2022-04-03 RX ORDER — CARBOPROST TROMETHAMINE 250 UG/ML
250 INJECTION, SOLUTION INTRAMUSCULAR AS NEEDED
Status: DISCONTINUED | OUTPATIENT
Start: 2022-04-03 | End: 2022-04-03 | Stop reason: HOSPADM

## 2022-04-03 RX ORDER — MORPHINE SULFATE 2 MG/ML
1 INJECTION, SOLUTION INTRAMUSCULAR; INTRAVENOUS EVERY 4 HOURS PRN
Status: DISCONTINUED | OUTPATIENT
Start: 2022-04-03 | End: 2022-04-05 | Stop reason: HOSPADM

## 2022-04-03 RX ORDER — ROPIVACAINE HYDROCHLORIDE 2 MG/ML
INJECTION, SOLUTION EPIDURAL; INFILTRATION; PERINEURAL AS NEEDED
Status: DISCONTINUED | OUTPATIENT
Start: 2022-04-03 | End: 2022-04-03 | Stop reason: SURG

## 2022-04-03 RX ORDER — ONDANSETRON 4 MG/1
4 TABLET, FILM COATED ORAL EVERY 8 HOURS PRN
Status: DISCONTINUED | OUTPATIENT
Start: 2022-04-03 | End: 2022-04-05 | Stop reason: HOSPADM

## 2022-04-03 RX ORDER — DOCUSATE SODIUM 100 MG/1
100 CAPSULE, LIQUID FILLED ORAL 2 TIMES DAILY
Status: DISCONTINUED | OUTPATIENT
Start: 2022-04-03 | End: 2022-04-05 | Stop reason: HOSPADM

## 2022-04-03 RX ORDER — EPHEDRINE SULFATE 50 MG/ML
10 INJECTION, SOLUTION INTRAVENOUS
Status: DISCONTINUED | OUTPATIENT
Start: 2022-04-03 | End: 2022-04-03 | Stop reason: HOSPADM

## 2022-04-03 RX ORDER — HYDROXYZINE HYDROCHLORIDE 25 MG/1
50 TABLET, FILM COATED ORAL NIGHTLY PRN
Status: DISCONTINUED | OUTPATIENT
Start: 2022-04-03 | End: 2022-04-05 | Stop reason: HOSPADM

## 2022-04-03 RX ORDER — SUFENTANIL CITRATE 50 UG/ML
INJECTION EPIDURAL; INTRAVENOUS AS NEEDED
Status: DISCONTINUED | OUTPATIENT
Start: 2022-04-03 | End: 2022-04-03 | Stop reason: SURG

## 2022-04-03 RX ORDER — SODIUM CHLORIDE 0.9 % (FLUSH) 0.9 %
1-10 SYRINGE (ML) INJECTION AS NEEDED
Status: DISCONTINUED | OUTPATIENT
Start: 2022-04-03 | End: 2022-04-03 | Stop reason: HOSPADM

## 2022-04-03 RX ORDER — HYDROCORTISONE 25 MG/G
1 CREAM TOPICAL AS NEEDED
Status: DISCONTINUED | OUTPATIENT
Start: 2022-04-03 | End: 2022-04-05 | Stop reason: HOSPADM

## 2022-04-03 RX ORDER — PROMETHAZINE HYDROCHLORIDE 25 MG/1
25 TABLET ORAL EVERY 6 HOURS PRN
Status: DISCONTINUED | OUTPATIENT
Start: 2022-04-03 | End: 2022-04-05 | Stop reason: HOSPADM

## 2022-04-03 RX ORDER — BISACODYL 10 MG
10 SUPPOSITORY, RECTAL RECTAL DAILY PRN
Status: DISCONTINUED | OUTPATIENT
Start: 2022-04-04 | End: 2022-04-05 | Stop reason: HOSPADM

## 2022-04-03 RX ORDER — TERBUTALINE SULFATE 1 MG/ML
0.25 INJECTION, SOLUTION SUBCUTANEOUS AS NEEDED
Status: DISCONTINUED | OUTPATIENT
Start: 2022-04-03 | End: 2022-04-03 | Stop reason: HOSPADM

## 2022-04-03 RX ORDER — CALCIUM CARBONATE 200(500)MG
2 TABLET,CHEWABLE ORAL 3 TIMES DAILY PRN
Status: CANCELLED | OUTPATIENT
Start: 2022-04-03

## 2022-04-03 RX ORDER — METHYLERGONOVINE MALEATE 0.2 MG/ML
200 INJECTION INTRAVENOUS ONCE AS NEEDED
Status: DISCONTINUED | OUTPATIENT
Start: 2022-04-03 | End: 2022-04-05 | Stop reason: HOSPADM

## 2022-04-03 RX ORDER — ONDANSETRON 2 MG/ML
4 INJECTION INTRAMUSCULAR; INTRAVENOUS EVERY 6 HOURS PRN
Status: CANCELLED | OUTPATIENT
Start: 2022-04-03

## 2022-04-03 RX ORDER — BUTORPHANOL TARTRATE 1 MG/ML
1 INJECTION, SOLUTION INTRAMUSCULAR; INTRAVENOUS
Status: DISCONTINUED | OUTPATIENT
Start: 2022-04-03 | End: 2022-04-03 | Stop reason: HOSPADM

## 2022-04-03 RX ORDER — HYDROCODONE BITARTRATE AND ACETAMINOPHEN 5; 325 MG/1; MG/1
1 TABLET ORAL EVERY 4 HOURS PRN
Status: DISCONTINUED | OUTPATIENT
Start: 2022-04-03 | End: 2022-04-05 | Stop reason: HOSPADM

## 2022-04-03 RX ORDER — ONDANSETRON 2 MG/ML
4 INJECTION INTRAMUSCULAR; INTRAVENOUS EVERY 6 HOURS PRN
Status: DISCONTINUED | OUTPATIENT
Start: 2022-04-03 | End: 2022-04-03 | Stop reason: HOSPADM

## 2022-04-03 RX ORDER — ONDANSETRON 4 MG/1
4 TABLET, FILM COATED ORAL EVERY 6 HOURS PRN
Status: CANCELLED | OUTPATIENT
Start: 2022-04-03

## 2022-04-03 RX ORDER — OXYTOCIN/0.9 % SODIUM CHLORIDE 30/500 ML
125 PLASTIC BAG, INJECTION (ML) INTRAVENOUS CONTINUOUS PRN
Status: COMPLETED | OUTPATIENT
Start: 2022-04-03 | End: 2022-04-03

## 2022-04-03 RX ORDER — ACETAMINOPHEN 325 MG/1
650 TABLET ORAL EVERY 4 HOURS PRN
Status: DISCONTINUED | OUTPATIENT
Start: 2022-04-03 | End: 2022-04-03 | Stop reason: HOSPADM

## 2022-04-03 RX ORDER — OXYTOCIN/0.9 % SODIUM CHLORIDE 30/500 ML
999 PLASTIC BAG, INJECTION (ML) INTRAVENOUS ONCE
Status: COMPLETED | OUTPATIENT
Start: 2022-04-03 | End: 2022-04-03

## 2022-04-03 RX ORDER — OXYTOCIN/0.9 % SODIUM CHLORIDE 30/500 ML
250 PLASTIC BAG, INJECTION (ML) INTRAVENOUS CONTINUOUS
Status: DISPENSED | OUTPATIENT
Start: 2022-04-03 | End: 2022-04-03

## 2022-04-03 RX ORDER — CALCIUM CARBONATE 200(500)MG
2 TABLET,CHEWABLE ORAL 3 TIMES DAILY PRN
Status: DISCONTINUED | OUTPATIENT
Start: 2022-04-03 | End: 2022-04-05 | Stop reason: HOSPADM

## 2022-04-03 RX ORDER — DEXMEDETOMIDINE HYDROCHLORIDE 100 UG/ML
INJECTION, SOLUTION INTRAVENOUS AS NEEDED
Status: DISCONTINUED | OUTPATIENT
Start: 2022-04-03 | End: 2022-04-03 | Stop reason: SURG

## 2022-04-03 RX ORDER — ONDANSETRON 2 MG/ML
4 INJECTION INTRAMUSCULAR; INTRAVENOUS EVERY 6 HOURS PRN
Status: DISCONTINUED | OUTPATIENT
Start: 2022-04-03 | End: 2022-04-05 | Stop reason: HOSPADM

## 2022-04-03 RX ORDER — IBUPROFEN 600 MG/1
600 TABLET ORAL EVERY 8 HOURS PRN
Status: DISCONTINUED | OUTPATIENT
Start: 2022-04-03 | End: 2022-04-03

## 2022-04-03 RX ORDER — TRISODIUM CITRATE DIHYDRATE AND CITRIC ACID MONOHYDRATE 500; 334 MG/5ML; MG/5ML
30 SOLUTION ORAL ONCE AS NEEDED
Status: DISCONTINUED | OUTPATIENT
Start: 2022-04-03 | End: 2022-04-03 | Stop reason: HOSPADM

## 2022-04-03 RX ORDER — SODIUM CHLORIDE 0.9 % (FLUSH) 0.9 %
1-10 SYRINGE (ML) INJECTION AS NEEDED
Status: DISCONTINUED | OUTPATIENT
Start: 2022-04-03 | End: 2022-04-05 | Stop reason: HOSPADM

## 2022-04-03 RX ORDER — PROMETHAZINE HYDROCHLORIDE 12.5 MG/1
12.5 SUPPOSITORY RECTAL EVERY 6 HOURS PRN
Status: DISCONTINUED | OUTPATIENT
Start: 2022-04-03 | End: 2022-04-05 | Stop reason: HOSPADM

## 2022-04-03 RX ORDER — MISOPROSTOL 200 UG/1
600 TABLET ORAL ONCE AS NEEDED
Status: DISCONTINUED | OUTPATIENT
Start: 2022-04-03 | End: 2022-04-05 | Stop reason: HOSPADM

## 2022-04-03 RX ORDER — LIDOCAINE HYDROCHLORIDE 10 MG/ML
5 INJECTION, SOLUTION EPIDURAL; INFILTRATION; INTRACAUDAL; PERINEURAL AS NEEDED
Status: DISCONTINUED | OUTPATIENT
Start: 2022-04-03 | End: 2022-04-03 | Stop reason: HOSPADM

## 2022-04-03 RX ADMIN — DEXMEDETOMIDINE HYDROCHLORIDE 20 MCG: 100 INJECTION, SOLUTION INTRAVENOUS at 06:13

## 2022-04-03 RX ADMIN — SUFENTANIL CITRATE 20 MCG: 50 INJECTION EPIDURAL; INTRAVENOUS at 06:00

## 2022-04-03 RX ADMIN — IBUPROFEN 600 MG: 100 SUSPENSION ORAL at 14:48

## 2022-04-03 RX ADMIN — OXYTOCIN-SODIUM CHLORIDE 0.9% IV SOLN 30 UNIT/500ML 999 ML/HR: 30-0.9/5 SOLUTION at 08:56

## 2022-04-03 RX ADMIN — SODIUM CHLORIDE, POTASSIUM CHLORIDE, SODIUM LACTATE AND CALCIUM CHLORIDE 125 ML/HR: 600; 310; 30; 20 INJECTION, SOLUTION INTRAVENOUS at 04:35

## 2022-04-03 RX ADMIN — SODIUM CHLORIDE, POTASSIUM CHLORIDE, SODIUM LACTATE AND CALCIUM CHLORIDE 125 ML/HR: 600; 310; 30; 20 INJECTION, SOLUTION INTRAVENOUS at 07:06

## 2022-04-03 RX ADMIN — LIDOCAINE HYDROCHLORIDE AND EPINEPHRINE 3 ML: 15; 5 INJECTION, SOLUTION EPIDURAL at 05:58

## 2022-04-03 RX ADMIN — SUFENTANIL CITRATE 30 MCG: 50 INJECTION EPIDURAL; INTRAVENOUS at 06:13

## 2022-04-03 RX ADMIN — ROPIVACAINE HYDROCHLORIDE 4 ML/HR: 2 INJECTION, SOLUTION EPIDURAL; INFILTRATION at 06:13

## 2022-04-03 RX ADMIN — ROPIVACAINE HYDROCHLORIDE 8 ML: 2 INJECTION, SOLUTION EPIDURAL; INFILTRATION at 06:00

## 2022-04-03 RX ADMIN — DEXMEDETOMIDINE HYDROCHLORIDE 20 MCG: 100 INJECTION, SOLUTION INTRAVENOUS at 06:00

## 2022-04-03 RX ADMIN — SODIUM CHLORIDE, POTASSIUM CHLORIDE, SODIUM LACTATE AND CALCIUM CHLORIDE 1000 ML: 600; 310; 30; 20 INJECTION, SOLUTION INTRAVENOUS at 05:42

## 2022-04-03 RX ADMIN — BUTORPHANOL TARTRATE 1 MG: 1 INJECTION, SOLUTION INTRAMUSCULAR; INTRAVENOUS at 04:48

## 2022-04-03 RX ADMIN — Medication 1 APPLICATION: at 12:44

## 2022-04-03 RX ADMIN — OXYTOCIN-SODIUM CHLORIDE 0.9% IV SOLN 30 UNIT/500ML 250 ML/HR: 30-0.9/5 SOLUTION at 09:25

## 2022-04-03 NOTE — L&D DELIVERY NOTE
[facility]  Vaginal Delivery Note   Review the Delivery Report for details.       Delivery     Delivery: Vaginal, Spontaneous     YOB: 2022    Time of Birth:  Gestational Age 8:53 AM   39w0d     Anesthesia: Epidural     Delivering clinician: Holger Chawla    Forceps?   No   Vacuum? No    Shoulder dystocia present: No        Delivery narrative:  Progressed to C/C/+2 and pushed well to deliver a LVIM. MARIEL to ROT vigorous to mom's abdomen. Cord clamping delayed for 60 seconds. Placenta spontaneous and intact with 3VC after IV Pitocin. 1st degree perineal and right labial laceration repaired with 3-0 Vicryl Rapide. Epidural anes. EBL 100mL. No complications. Sponge and needle count correct.     Infant    Findings: male  infant     Infant observations: Weight: 3070 g (6 lb 12.3 oz)   Length:   in  Observations/Comments:        Apgars: 8  @ 1 minute /    9  @ 5 minutes   Infant Name:      Placenta, Cord, and Fluid    Placenta delivered  Spontaneous  at   4/3/2022  8:56 AM     Cord: 3 vessels  present.   Nuchal Cord?  no   Cord blood obtained: No    Cord gases obtained:  No    Cord gas results: Venous:  No results found for: PHCVEN    Arterial:  No results found for: PHCART     Repair    Episiotomy: None     No    Lacerations: Yes  Laceration Information  Laceration Repaired?   Perineal: 1st  Yes    Periurethral:       Labial: right  Yes    Sulcus:       Vaginal:       Cervical:         Suture used for repair: 3-0 Vicryl Rapide   Estimated Blood Loss:               Quantitative Blood Loss:    QBL from VAG DEL: 78 (04/03/22 0912)             Complications  none    Disposition  Mother to Mother Baby/Postpartum  in stable condition currently.  Baby to remains with mom  in stable condition currently.      Holger Chawla MD  04/03/22  09:22 CDT

## 2022-04-03 NOTE — PLAN OF CARE
Goal Outcome Evaluation:  Plan of Care Reviewed With: patient, spouse        Progress: improving  Outcome Evaluation: vss. ff ml u2. scant lochia. ambulatory and voiding. IID in RFA. bonding well with baby. direct breastfeeding and pumping. taking motrin for pain. has comfort products for laceration. lac healing without s/s of dehicence or infection.

## 2022-04-03 NOTE — H&P
[facility]  Nena YOON  : 1995  MRN: [id number]  CSN: [id number]    History and Physical    Subjective   Nena YOON is a 26 y.o. year old  with an Estimated Date of Delivery: 4/10/22 currently at 39w0d presenting with regular contractions.    Prenatal care has been with Dr. Xochitl Champagne.  It has been benign.    OB History    Para Term  AB Living   1 0 0 0 0 0   SAB IAB Ectopic Molar Multiple Live Births   0 0 0 0 0 0      # Outcome Date GA Lbr Ye/2nd Weight Sex Delivery Anes PTL Lv   1 Current              History reviewed. No pertinent past medical history.  Past Surgical History:   Procedure Laterality Date   • WISDOM TOOTH EXTRACTION         Current Facility-Administered Medications:   •  acetaminophen (TYLENOL) tablet 650 mg, 650 mg, Oral, Q4H PRN, Holger Chawla MD  •  butorphanol (STADOL) injection 1 mg, 1 mg, Intravenous, Q3H PRN, Holger Chawla MD, 1 mg at 22 0448  •  butorphanol (STADOL) injection 2 mg, 2 mg, Intravenous, Q3H PRN, Holger Chawla MD  •  carboprost (HEMABATE) injection 250 mcg, 250 mcg, Intramuscular, PRN, Holger Chawla MD  •  ePHEDrine injection 10 mg, 10 mg, Intravenous, Q5 Min PRN, Armando Galvan CRNA  •  lactated ringers infusion, 125 mL/hr, Intravenous, Continuous, Holger Chawla MD, Last Rate: 125 mL/hr at 22 0706, 125 mL/hr at 22 0706  •  lidocaine PF 1% (XYLOCAINE) injection 5 mL, 5 mL, Intradermal, PRN, Holger Chawla MD  •  methylergonovine (METHERGINE) injection 200 mcg, 200 mcg, Intramuscular, Once PRN, Holger Chawla MD  •  miSOPROStol (CYTOTEC) tablet 800 mcg, 800 mcg, Rectal, PRN, Holger Chawla MD  •  ondansetron (ZOFRAN) tablet 4 mg, 4 mg, Oral, Q6H PRN **OR** ondansetron (ZOFRAN) injection 4 mg, 4 mg, Intravenous, Q6H PRN, Holger Chawla MD  •  oxytocin (PITOCIN) 30 units in 0.9% sodium chloride 500 mL (premix), 999 mL/hr, Intravenous, Once **FOLLOWED BY**  "[] oxytocin (PITOCIN) 30 units in 0.9% sodium chloride 500 mL (premix), 250 mL/hr, Intravenous, Continuous **FOLLOWED BY** oxytocin (PITOCIN) 30 units in 0.9% sodium chloride 500 mL (premix), 125 mL/hr, Intravenous, Continuous PRN, Holger Chawla MD  •  Sod Citrate-Citric Acid (BICITRA) solution 30 mL, 30 mL, Oral, Once PRN, Holger Chawla MD  •  Sod Citrate-Citric Acid (BICITRA) solution 30 mL, 30 mL, Oral, Once, Armando Galvan CRNA  •  sodium chloride 0.9 % flush 1-10 mL, 1-10 mL, Intravenous, PRN, Holger Chawla MD  •  sodium chloride 0.9 % flush 10 mL, 10 mL, Intravenous, Q12H, Holger Chawla MD  •  terbutaline (BRETHINE) injection 0.25 mg, 0.25 mg, Subcutaneous, PRN, Holger Chawla MD    Facility-Administered Medications Ordered in Other Encounters:   •  dexmedetomidine HCl (PRECEDEX) injection, , Intravenous, PRN, Armando Galvan CRNA, 20 mcg at 22 06  •  lidocaine-EPINEPHrine (XYLOCAINE W/EPI) 1.5 %-1:[id number] injection, , Epidural, PRN, Armando Galvan CRNA, 3 mL at 22 0558  •  ropivacaine (NAROPIN) 0.2 % injection, , Epidural, PRN, Armando Galvan CRNA, 8 mL at 22 06  •  ropivacaine (NAROPIN) 200 mg in 100 mL epidural, , Epidural, Continuous PRN, Armando Galvan CRNA, Last Rate: 4 mL/hr at 22 0613, 4 mL/hr at 22 06  •  SUFentanil (SUFENTA) injection, , Intravenous, PRN, Armando Galvan CRNA, 30 mcg at 22 06    No Known Allergies  Social History    Tobacco Use      Smoking status: Never Smoker      Smokeless tobacco: Never Used    Review of Systems      Objective   /67   Pulse 91   Temp 98.3 °F (36.8 °C) (Temporal)   Resp 16   Ht 157.5 cm (62\")   Wt 64.9 kg (143 lb)   SpO2 100%   Breastfeeding Yes   BMI 26.16 kg/m²   General: well developed; well nourished  no acute distress   Heart: regular rate and rhythm   Lungs: breathing is unlabored   Abdomen: soft, non-tender; no masses   FHT's: reactive "   Cervix: was checked (by me): 10 cm / 100 % / +1   Presentation: cephalic   Contractions:  EFW by Leopold's:  EFW by recent u/s: regular every 3 minutes  7 1/2#         Prenatal Labs  Lab Results   Component Value Date    HGB 13.1 04/03/2022    HEPBSAG Negative 09/14/2021    ABO A 04/03/2022    RH Positive 04/03/2022    ABSCRN Negative 04/03/2022    [id number] Non Reactive 09/14/2021    HEPCVIRUSABY 0.2 09/14/2021    URINECX Final report 09/14/2021       Current Labs Reviewed   No data reviewed       Assessment   1. IUP at 39w0d  2. Fetus reassuring  3. Group B strep status: negative  4. Active labor     Plan   1. Admit to L&D, epidural in place, AROM clear fluid noted, begin to push soon    Holger Chawla MD  4/3/2022  07:26 CDT

## 2022-04-03 NOTE — ANESTHESIA PREPROCEDURE EVALUATION
Anesthesia Evaluation     Patient summary reviewed and Nursing notes reviewed   NPO Solid Status: > 8 hours  NPO Liquid Status: > 4 hours           Airway   Mallampati: I  TM distance: >3 FB  Neck ROM: full  No difficulty expected  Dental - normal exam     Pulmonary - negative pulmonary ROS and normal exam   Cardiovascular - negative cardio ROS and normal exam        Neuro/Psych- negative ROS  GI/Hepatic/Renal/Endo - negative ROS     Musculoskeletal (-) negative ROS    Abdominal  - normal exam   Substance History - negative use     OB/GYN    (+) Pregnant,         Other - negative ROS                       Anesthesia Plan    ASA 2     epidural       Anesthetic plan, all risks, benefits, and alternatives have been provided, discussed and informed consent has been obtained with: patient and spouse/significant other.        CODE STATUS:

## 2022-04-03 NOTE — ANESTHESIA PROCEDURE NOTES
Labor Epidural      Patient location during procedure: OB  Indication:at surgeon's request  Performed By  CRNA: Armando Galvan CRNA  Preanesthetic Checklist  Completed: patient identified, IV checked, site marked, risks and benefits discussed, surgical consent, monitors and equipment checked, pre-op evaluation and timeout performed  Prep:  Pt Position:sitting  Sterile Tech:gloves and sterile barrier  Prep:DuraPrep  Monitoring:blood pressure monitoring and continuous pulse oximetry  Epidural Block Procedure:  Approach:midline  Guidance:landmark technique and palpation technique  Location:L3-L4  Needle Type:Tuohy  Needle Gauge:17 G  Loss of Resistance Medium: saline  Loss of Resistance: 7cm  Cath Depth at skin:10 cm  Paresthesia: none  Aspiration:negative  Test Dose:negative  Number of Attempts: 1  Post Assessment:  Dressing:occlusive dressing applied and secured with tape  Pt Tolerance:patient tolerated the procedure well with no apparent complications  Complications:no

## 2022-04-04 LAB
HCT VFR BLD AUTO: 34.7 % (ref 34–46.6)
HGB BLD-MCNC: 11.2 G/DL (ref 12–15.9)

## 2022-04-04 PROCEDURE — 85018 HEMOGLOBIN: CPT | Performed by: OBSTETRICS & GYNECOLOGY

## 2022-04-04 PROCEDURE — 85014 HEMATOCRIT: CPT | Performed by: OBSTETRICS & GYNECOLOGY

## 2022-04-04 RX ADMIN — IBUPROFEN 600 MG: 100 SUSPENSION ORAL at 11:40

## 2022-04-04 RX ADMIN — IBUPROFEN 600 MG: 100 SUSPENSION ORAL at 03:09

## 2022-04-04 RX ADMIN — IBUPROFEN 600 MG: 100 SUSPENSION ORAL at 21:19

## 2022-04-04 NOTE — PROGRESS NOTES
"      Holger Chawla MD  [facility]  [address]  [place] [address]  Office [phone]  Fax [phone]    [facility]  Vaginal Delivery Progress Note    Subjective   Postpartum Day 1: Vaginal Delivery    The patient feels well.  Her pain is well controlled with nonsteroidal anti-inflammatory drugs.   She is ambulating well.  Patient describes her bleeding as thin lochia.    Breastfeeding: infant latching without difficulty.    Objective     Vital Signs Range for the last 24 hours  Temperature: Temp:  [97.9 °F (36.6 °C)-99 °F (37.2 °C)] 97.9 °F (36.6 °C)   Temp Source: Temp src: Oral   BP: BP: ()/(54-70) 96/59   Pulse: Heart Rate:  [70-85] 77   Respirations: Resp:  [12-20] 16   SPO2: SpO2:  [98 %-100 %] 98 %   O2 Amount (l/min):     O2 Devices Device (Oxygen Therapy): room air   Weight:       Admit Height:  Height: 157.5 cm (62\")      Physical Exam:  General:  no acute distresss.  Abdomen: Fundus: appropriate, firm, non tender  Extremities: normal, atraumatic, no cyanosis, and trace edema.       Lab results reviewed:  Yes   Rubella:  No results found for: RUBELLAIGGIN Nurse Transcribed from prenatal record --  No components found for: EXTRUBELQUAL  Rh Status:    RH type   Date Value Ref Range Status   04/03/2022 Positive  Final     Immunizations:   Immunization History   Administered Date(s) Administered   • Tdap 01/12/2022     Lab Results (last 24 hours)     Procedure Component Value Units Date/Time    Hemoglobin & Hematocrit, Blood [[id number]]  (Abnormal) Collected: 04/04/22 0454    Specimen: Blood Updated: 04/04/22 0505     Hemoglobin 11.2 g/dL      Hematocrit 34.7 %           External Prenatal Results     Pregnancy Outside Results - Transcribed From Office Records - See Scanned Records For Details     Test Value Date Time    ABO  A  04/03/22 0435    Rh  Positive  04/03/22 0435    Antibody Screen  Negative  04/03/22 0435       Negative  09/14/21 0726    Varicella IgG       Rubella  " 1.07 index 09/14/21 0726    Hgb  11.2 g/dL 04/04/22 0454       13.1 g/dL 04/03/22 0435       12.3 g/dL 03/15/22 1422       11.8 g/dL 01/12/22 0855       13.6 g/dL 09/14/21 0726    Hct  34.7 % 04/04/22 0454       39.1 % 04/03/22 0435       36.5 % 03/15/22 1422       39.9 % 09/14/21 0726    Glucose Fasting GTT       Glucose Tolerance Test 1 hour       Glucose Tolerance Test 3 hour       Gonorrhea (discrete)  Negative  03/16/22 1329    Chlamydia (discrete)  Negative  03/16/22 1329    RPR  Non Reactive  09/14/21 0726    VDRL       Syphilis Antibody       HBsAg  Negative  09/14/21 0726    Herpes Simplex Virus PCR       Herpes Simplex VIrus Culture       HIV  Non Reactive  09/14/21 0726    Hep C RNA Quant PCR       Hep C Antibody  0.2 s/co ratio 09/14/21 0726    AFP       Group B Strep  Negative  03/16/22 1329    GBS Susceptibility to Clindamycin       GBS Susceptibility to Erythromycin       Fetal Fibronectin       Genetic Testing, Maternal Blood             Drug Screening     Test Value Date Time    Urine Drug Screen       Amphetamine Screen       Barbiturate Screen       Benzodiazepine Screen       Methadone Screen       Phencyclidine Screen       Opiates Screen       THC Screen       Cocaine Screen       Propoxyphene Screen       Buprenorphine Screen       Methamphetamine Screen       Oxycodone Screen       Tricyclic Antidepressants Screen             Legend    ^: Historical                        Assessment/Plan       Normal labor and delivery      Nena YOON is Day 1  post-partum  Vaginal, Spontaneous  none .      Plan:  Continue current care.      Holger Chawla MD  4/4/2022  09:29 CDT

## 2022-04-04 NOTE — LACTATION NOTE
Mother's Name: Nena Nelson  Phone #:[phone]  Infant Name:   : 4/3/22  Gestation: 39w0d  Day of life: 1  Birth weight:  6-12.3 (3070g)  Discharge weight:  Weight Loss:   24 hour Summary of Feeds: 7BF + 7 ml EBM Voids: 4 Stools:  4  Assistive devices (shields, shells, etc):  Significant Maternal history:   Maternal Concerns:  Sore nipples  Maternal Goal: breastfeed  Mother's Medications: PNV  Breastpump for home: Rx faxed  Ped follow up appt:    Reviewed initial breastfeeding apcket and book provided. Patient concerned about sore nipples and latching. Bilateral nipples bruised, red, and tender. Recommended viewing latching and hand expression videos on handout prior to next feeding. Discussed positioning and latching interventions to help obtain a deeper latch. Recommended hand expressing drops to infant's mouth prior to waking for feedings. Encouraged pt to call for assistance with next feeding and apply EBM to nipples for healing.     Instructed mom our lactation team is here for continued support throughout their breastfeeding journey. Our team has encouraged mom to call with any questions or concerns that may arise after discharge.    1545  Assisted with ventral position and latching with pt permission. Demonstrated rolling breast deeply for more comfortable latch and improved milk transfer. Reviewed signs of a good feeding and pt reported improved comfort with deep latch. Encouraged continued hand expression. Infant nursed well for approximately 5+ minutes and fell asleep and released the breast. Encouraged hand expressing to infant's mouth and infant self latched, mother self adjusted latch and infant nursed another 1-2 minutes. Encouraged keeping infant ksin to skin at breast and allow rest periods while hand expressing. Pt voiced appreciation. Offered continued assistance as needed.

## 2022-04-04 NOTE — ANESTHESIA POSTPROCEDURE EVALUATION
"Patient: Nena YOON    Procedure Summary     Date: 04/03/22 Room / Location:     Anesthesia Start: 0552 Anesthesia Stop: 0853    Procedure: LABOR ANALGESIA Diagnosis:     Scheduled Providers:  Provider:     Anesthesia Type: epidural ASA Status: 2          Anesthesia Type: epidural    Vitals  Vitals Value Taken Time   BP 96/59 04/04/22 0824   Temp 97.9 °F (36.6 °C) 04/04/22 0824   Pulse 77 04/04/22 0824   Resp 16 04/04/22 0824   SpO2 98 % 04/04/22 0824           Post Anesthesia Care and Evaluation    Patient location during evaluation: floor  Patient participation: complete - patient participated  Level of consciousness: awake and alert  Pain management: satisfactory to patient  Airway patency: patent  Anesthetic complications: No anesthetic complications  PONV Status: none  Cardiovascular status: acceptable  Respiratory status: acceptable  Hydration status: acceptable  Post Neuraxial Block status: Motor and sensory function returned to baseline and No signs or symptoms of PDPH  Comments: Blood pressure 96/59, pulse 77, temperature 97.9 °F (36.6 °C), temperature source Oral, resp. rate 16, height 157.5 cm (62\"), weight 64.9 kg (143 lb), SpO2 98 %, currently breastfeeding.        "

## 2022-04-04 NOTE — PLAN OF CARE
Goal Outcome Evaluation:  Plan of Care Reviewed With: patient, spouse   VSS, FFMU2-3, scant lochia rubra, eager to breastfeed infant and doing well, voiding, ambulating well, pain managed with PO PRN meds.

## 2022-04-04 NOTE — PLAN OF CARE
Goal Outcome Evaluation:  Plan of Care Reviewed With: patient, spouse        Progress: improving  Outcome Evaluation: VSS. ff/ml/u2. scant lochia. ambulating in room. IV removed. Breastfeeding has improved. Parents bonding well with infant.

## 2022-04-05 VITALS
HEART RATE: 80 BPM | WEIGHT: 143 LBS | BODY MASS INDEX: 26.31 KG/M2 | HEIGHT: 62 IN | TEMPERATURE: 98.4 F | OXYGEN SATURATION: 98 % | RESPIRATION RATE: 16 BRPM | SYSTOLIC BLOOD PRESSURE: 112 MMHG | DIASTOLIC BLOOD PRESSURE: 74 MMHG

## 2022-04-05 NOTE — LACTATION NOTE
Mother's Name: Nena Nelson  Phone #:[phone]  Infant Name:   : 4/3/22  Gestation: 39w0d  Day of life: 1  Birth weight:  6-12.3 (3070g)  Discharge weight:  Weight Loss:   24 hour Summary of Feeds: 7BF + 7 ml EBM Voids: 4 Stools:  4  Assistive devices (shields, shells, etc):  Significant Maternal history:   Maternal Concerns:  Sore nipples  Maternal Goal: breastfeed  Mother's Medications: PNV  Breastpump for home: Rx faxed  Ped follow up appt:    Follow-up with mom,  Denies any problems at this time.  Infant lying in mom's lap, sleeping.  Offered assistance as needed tonight.    Instructed mom our lactation team is here for continued support throughout their breastfeeding journey. Our team has encouraged mom to call with any questions or concerns that may arise after discharge.

## 2022-04-05 NOTE — DISCHARGE SUMMARY
[facility]    Stephanie Duffyn, APRN  [address]  [place] [address]  [phone]      Discharge Summary     Alessio YOON  : 1995  MRN: [id number]  CSN: [id number]    Date of Admission: 4/3/2022   Date of Discharge:  2022   Delivering Physician: Holger Chawla        Admission Diagnosis: 1. Pregnancy [Z34.90]   Discharge Diagnosis: 1. Pregnancy at 39w0d - delivered       Procedures: 4/3/2022  - Vaginal, Spontaneous       Hospital Course  Patient is a 26 y.o.  who at 39w0d had a vaginal birth.  Her postpartum course was without complications.  On PPD #2 she was ready for discharge.  She had normal lochia and pain well controlled with oral medications.    Infant  male  fetus weighing 3070 g (6 lb 12.3 oz)   Apgars -  8 @ 1 minute /  9 @ 5 minutes.    Discharge labs  Lab Results   Component Value Date    WBC 11.86 (H) 2022    HGB 11.2 (L) 2022    HCT 34.7 2022     2022       Discharge Medications     Discharge Medications      Continue These Medications      Instructions Start Date   prenatal (CLASSIC) vitamin  tablet  Generic drug: prenatal vitamin   Oral, Daily             External Prenatal Results     Pregnancy Outside Results - Transcribed From Office Records - See Scanned Records For Details     Test Value Date Time    ABO  A  22 043    Rh  Positive  22 0435    Antibody Screen  Negative  22 0435       Negative  21 0726    Varicella IgG       Rubella  1.07 index 21 0726    Hgb  11.2 g/dL 22 0454       13.1 g/dL 22 0435       12.3 g/dL 03/15/22 1422       11.8 g/dL 22 0855       13.6 g/dL 21 0726    Hct  34.7 % 22 0454       39.1 % 22 0435       36.5 % 03/15/22 1422       39.9 % 21 0726    Glucose Fasting GTT       Glucose Tolerance Test 1 hour       Glucose Tolerance Test 3 hour       Gonorrhea (discrete)  Negative  22 1329     Chlamydia (discrete)  Negative  03/16/22 1329    RPR  Non Reactive  09/14/21 0726    VDRL       Syphilis Antibody       HBsAg  Negative  09/14/21 0726    Herpes Simplex Virus PCR       Herpes Simplex VIrus Culture       HIV  Non Reactive  09/14/21 0726    Hep C RNA Quant PCR       Hep C Antibody  0.2 s/co ratio 09/14/21 0726    AFP       Group B Strep  Negative  03/16/22 1329    GBS Susceptibility to Clindamycin       GBS Susceptibility to Erythromycin       Fetal Fibronectin       Genetic Testing, Maternal Blood             Drug Screening     Test Value Date Time    Urine Drug Screen       Amphetamine Screen       Barbiturate Screen       Benzodiazepine Screen       Methadone Screen       Phencyclidine Screen       Opiates Screen       THC Screen       Cocaine Screen       Propoxyphene Screen       Buprenorphine Screen       Methamphetamine Screen       Oxycodone Screen       Tricyclic Antidepressants Screen             Legend    ^: Historical                        Discharge Disposition Home or Self Care   Condition on Discharge: good   Follow-up: 6 weeks with Dr. Champagne or BHARGAVI Lopez, JUVE. Discussed and encouraged outpatient lactation support and education as needed.     Plan for discharge reviewed with Dr. Chawla.    This note is been signed electronically.    Stephanie Cook DNP, BHARGAVI, JUVE, RNC-OB  4/5/2022

## 2022-04-05 NOTE — PROGRESS NOTES
"      BHARGAVI Pabon  [facility]  [address]  [place] [address]  Office [phone]  Fax [phone]    [facility]  Vaginal Delivery Progress Note    Subjective   Postpartum Day 1: Vaginal Delivery    The patient feels well.  Her pain is well controlled with Motrin.   She is ambulating well.  Patient describes her bleeding as thin lochia.    Breastfeeding: infant latching without difficulty.    Objective     Vital Signs Range for the last 24 hours  Temperature: Temp:  [97.9 °F (36.6 °C)-99.2 °F (37.3 °C)] 99.2 °F (37.3 °C)   Temp Source: Temp src: Axillary   BP: BP: ()/(59-68) 110/68   Pulse: Heart Rate:  [77-78] 78   Respirations: Resp:  [16] 16   SPO2: SpO2:  [98 %] 98 %   O2 Amount (l/min):     O2 Devices Device (Oxygen Therapy): room air   Weight:       Admit Height:  Height: 157.5 cm (62\")      Physical Exam:  General:  no acute distresss.  Abdomen: abdomen is soft without significant tenderness, masses, organomegaly or guarding. Fundus: appropriate, firm, non tender  Extremities: normal, atraumatic, no cyanosis, and trace edema.       Lab results reviewed:  Yes   Rubella:  No results found for: RUBELLAIGGIN Nurse Transcribed from prenatal record --  No components found for: EXTRUBELQUAL  Rh Status:    RH type   Date Value Ref Range Status   04/03/2022 Positive  Final     Immunizations:   Immunization History   Administered Date(s) Administered   • Tdap 01/12/2022     Lab Results (last 24 hours)     ** No results found for the last 24 hours. **          External Prenatal Results     Pregnancy Outside Results - Transcribed From Office Records - See Scanned Records For Details     Test Value Date Time    ABO  A  04/03/22 0435    Rh  Positive  04/03/22 0435    Antibody Screen  Negative  04/03/22 0435       Negative  09/14/21 0726    Varicella IgG       Rubella  1.07 index 09/14/21 0726    Hgb  11.2 g/dL 04/04/22 0454       13.1 g/dL 04/03/22 0435       12.3 g/dL 03/15/22 1422       " 11.8 g/dL 01/12/22 0855       13.6 g/dL 09/14/21 0726    Hct  34.7 % 04/04/22 0454       39.1 % 04/03/22 0435       36.5 % 03/15/22 1422       39.9 % 09/14/21 0726    Glucose Fasting GTT       Glucose Tolerance Test 1 hour       Glucose Tolerance Test 3 hour       Gonorrhea (discrete)  Negative  03/16/22 1329    Chlamydia (discrete)  Negative  03/16/22 1329    RPR  Non Reactive  09/14/21 0726    VDRL       Syphilis Antibody       HBsAg  Negative  09/14/21 0726    Herpes Simplex Virus PCR       Herpes Simplex VIrus Culture       HIV  Non Reactive  09/14/21 0726    Hep C RNA Quant PCR       Hep C Antibody  0.2 s/co ratio 09/14/21 0726    AFP       Group B Strep  Negative  03/16/22 1329    GBS Susceptibility to Clindamycin       GBS Susceptibility to Erythromycin       Fetal Fibronectin       Genetic Testing, Maternal Blood             Drug Screening     Test Value Date Time    Urine Drug Screen       Amphetamine Screen       Barbiturate Screen       Benzodiazepine Screen       Methadone Screen       Phencyclidine Screen       Opiates Screen       THC Screen       Cocaine Screen       Propoxyphene Screen       Buprenorphine Screen       Methamphetamine Screen       Oxycodone Screen       Tricyclic Antidepressants Screen             Legend    ^: Historical                        Assessment/Plan       Normal labor and delivery      Nena YOON is Day 2  post-partum  Vaginal, Spontaneous  none .      Plan:  Continue current care.  Discharge home with standard precautions and return to clinic in 4-6 weeks with Dr. Champagne or BHARGAVI Lopez CNM.      This note has been signed electronically.    Stephanie Cook DNP, BHARGAVI, JUVE, RNC-OB  4/5/2022  08:02 CDT

## 2022-04-05 NOTE — LACTATION NOTE
Mother's Name: Nena Nelson  Phone #:[phone]  Infant Name:   : 4[birth year]  Gestation: 39w0d  Day of life: 2  Birth weight:  6-12.3 (3070g)  Discharge weight: 6-6.7 (2911g)  Weight Loss: -5.18%  24 hour Summary of Feeds: 10 BF  Voids: 4 Stools:  1  Assistive devices (shields, shells, etc):  Significant Maternal history:   Maternal Concerns:  Sore nipples  Maternal Goal: breastfeed  Mother's Medications: PNV  Breastpump for home: double electric and hand pump  Ped follow up appt:    Firms knots in left axilla without accessory breast tissue noted. Instructed to limit stimulation and ice this area, avoiding breast. Reviewed discharge breastfeeding packet and provided soft shells and hand pump for home use. Recommended hands-on pumping with hospital grade breast pump after next breastfeed and prior to discharge to soften breasts and prevent further engorgement. Bilateral breasts full with firm areas noted deep in breast. Explained pumping for comfort after feeds as needed. Recommended follow up at [facility] for pre/post tomorrow.     Instructed mom our lactation team is here for continued support throughout their breastfeeding journey. Our team has encouraged mom to call with any questions or concerns that may arise after discharge.      Signs of Milk: Fullness, firmness, heaviness of breasts, leaking of milk.  Signs of Good Feed: Breast fullness prior to feed, breasts soft and comfortable after feeding. Infant content after feeding: calm, sleepy, relaxed and without continued hunger cues.  Signs of Plugged Ducts, Engorgement and Mastitis: Plugged ducts (milk entrapment in milk ducts)- small tender knots that often feel like little beans under breast tissue, usually tender. Massage on these areas of concern while breastfeeding or pumping to promote emptying.   Engorgement- fluid or excess milk, breasts become uncomfortably full, tight, firm (compare to the firmness of your cheek (mild), chin (moderate) or  forehead (severe). First line of treatment should be to BREASTFEED, if breasts remain full feeling after a feeding, it may be necessary to pump, ONLY UNTIL BREASTS ARE SOFT AND COMFORTABLE. DO NOT OVER PUMP (complete emptying of breasts can trigger even more milk which will cause continued, recurrent Engorgement).  Mastitis- Infection of the breast tissue, most often caused by plugged ducts that are not adequately treated by emptying, recurrent trauma to nipples or breasts (cracked or bleeding nipples). Signs: redness, swelling, tender knots or fever to breasts as well as generalized fever >101 degrees F that is often sudden onset. Treatment of mastitis, BREASTFEED! Pump after breastfeeding to achieve COMPLETE emptying of affected breast, utilizing massage to areas of concern, may use cold compress to affected area only after breast emptying. May take anti-inflammatories i.e. Ibuprofen, Motrin. CALL your OB for assessment and continued treatment with Antibiotics to adequately treat mastitis.  Infant Care: Over the first 2 weeks it is important to keep record of infant's feeding routine (feeding times and durations), wet and dirty diaper frequency, stool color and any spit ups that may occur.  Keep in mind, ALL babies will lose some weight initially (usually no more than 10% by day 3). Until infant returns to/ surpasses birth weight (which can take up to 2 weeks), it is important to offer feedings AT LEAST EVERY 3 HOURS. Remember, if you choose to supplement infant with formula or previously pumped milk, you should always pump in replacement of that feeding in order to promote and maintain a healthy milk supply!  Maternal Care: REST, sleep when the infant sleeps, stay hydrated (water is optimal) drink to thirst, increase caloric intake - breastfeeding mother's need an ADDITIONAL 500 calories per day , eat 3 meals/day as well as snacks in between, limit CAFFIENE intake to 2 cups/day. Ask your significant other, family  members or friends for help when needed, taking advantage of meal trains, allowing others to help with laundry, house chores, etc can help you focus on what is most important early on after delivery… you and your infant, and breastfeeding!   Medications to CONTINUE: Prenatal Vitamins are important to continue taking while breastfeeding. Fish oil, magnesium/calcium supplements often are helpful to support Mothers and their milk supply as well. Tylenol, Ibuprofen, regular Zyrtec, Claritin are SAFE if you suffer from seasonal allergies. Flonase is safe and often an effective medication to take if suffering from sinus drainage/pressure.  Medications to AVOID: Benadryl, Sudafed, any medications including “DM” or have a drying effect to sinus drainage will also dry a mother's milk up. Birth control- your OB will want to address birth control options with you usually around 4-6 weeks postpartum, be sure to notify your MD if you continue to breastfeed as some birth controls may significantly decrease your milk supply. Herbals- some herbs may also decrease your milk supply: PEPPERMINT, MENTHOL in any form (candies, essential oils, teas, etc), so check labels and avoid using in excess.  Pumping: Although we encourage you to focus on breastfeeding over the first 2-4 weeks, you will need to plan to begin pumping. We do recommend implementing pumping by the time infant is 4 weeks old. Pump 2-3 times per day immediately AFTER breastfeeding, it is normal to collect very small amounts initially, but the more consistently you pump, the more you will begin to collect. Store collected milk in refrigerator or freezer. You should also begin offering infant a bottle around 4 weeks. Remember to use slow flow nipples and PACE the bottle-feed. A bottle feed should take about as long as a breastfeeding session.

## 2022-04-06 ENCOUNTER — HOSPITAL ENCOUNTER (OUTPATIENT)
Dept: LACTATION | Facility: HOSPITAL | Age: 27
Discharge: HOME | End: 2022-04-06

## 2022-04-06 NOTE — LACTATION NOTE
Mother's Name:  Nena Nelson  G/P:    1/1  Breastfeeding Hx:  Significant Medical History:  Maternal Breast Assessment: Bilateral significant engorgement. Mother in pain with same. No nipple trauma. Nipples well-everted.    Infant's Name:  Dandre Nelson  Date of Birth:   [birth year]  Gestational age at Birth:  Age:    3 days  Physician:   Jono Yañez MD                     Reason for Visit:  Follow up           Infant's Birth weight:  6-12 (per mom)   Previous Weight:  Wt Loss:    Today's Weight:    6-7.4 Wt Loss:    Feeding History Since Discharge/Last Lactation Appt.: mom bfing every 2 hours; no more than 3 hour intervals between.    Past 24 Hours Voids/Stools:   / 1     Color of Stool:  Green yellow transitional     Left Breast:     10 mins +22 mls               Right Breast:   17 mins + 30 mls     Final weight:  6-9.2                     Total Minutes:    27         Total Weight Gain:     52     gms or 2 ounces!! AWESOME    Average Feeding Amount for Age: 15-30 mls or 1/2 - 1 once each feeding; 8-12 times in 24 hours.    Interventions:Observed bfing session. Very minor assistance given for positioning and latching. Amos latched deeply with large audible jaw dropping gulps noted the entire time. He did not require waking prompts  Used DoYouBuzzhony pump after bfing. 3 oz obtained easily. 20 mls hand expressed while infant at breast.     Education: engorgement, pumping    Notified MD/ Orders Received:    Feeding Plan:   BF any time Amos will allow you to do so--- sooner than normal---before he shows feeding cues-- to help you alleviate engorgement-- over the next 3 days.    Attempt bfing every 1 1/2 - 2 hours.   Pump for 10-15 mins, right after bfing to get breasts AS SOFT AS THE SIDE OF YOUR CHEEK.  At night, bf when he wakes. Do not pump, unless breasts are still very full after bfing.  Always offer both breasts each feed. If he refuses one, pump it for 10-15 mins.  Use Haakka while bfing.   Consider wearing  Ladybugs.  Hold Amos skin to skin very often.   Once back to birth weight, Amos can have one 4-hour span, at night, without being woken to feed.    Plan of Care:    Interventions accomplished satisfactorily, requires no further action.    Future Appointments:    Lactation: As desired by mother    Physician: Dr. Yañez, TODAY    Signature: RADHA Dorado    Date:  RADHA Dorado

## 2022-04-11 ENCOUNTER — TELEPHONE (OUTPATIENT)
Dept: OBSTETRICS AND GYNECOLOGY | Facility: CLINIC | Age: 27
End: 2022-04-11

## 2022-04-11 NOTE — TELEPHONE ENCOUNTER
Called to confirm with pt what her return to work letter needs to say. Per [place] pt ok to return working from home. Pt s/p vag delivery on 4-3-22. BS

## 2022-05-17 ENCOUNTER — POSTPARTUM VISIT (OUTPATIENT)
Dept: OBSTETRICS AND GYNECOLOGY | Facility: CLINIC | Age: 27
End: 2022-05-17

## 2022-05-17 VITALS
BODY MASS INDEX: 23.37 KG/M2 | SYSTOLIC BLOOD PRESSURE: 120 MMHG | WEIGHT: 127 LBS | DIASTOLIC BLOOD PRESSURE: 74 MMHG | HEIGHT: 62 IN

## 2022-05-17 DIAGNOSIS — Z30.41 ENCOUNTER FOR SURVEILLANCE OF CONTRACEPTIVE PILLS: ICD-10-CM

## 2022-05-17 PROCEDURE — 0503F POSTPARTUM CARE VISIT: CPT | Performed by: OBSTETRICS & GYNECOLOGY

## 2022-05-17 RX ORDER — NORETHINDRONE AND ETHINYL ESTRADIOL AND FERROUS FUMARATE 0.4-35(21)
1 KIT ORAL DAILY
Qty: 28 EACH | Refills: 12 | Status: SHIPPED | OUTPATIENT
Start: 2022-05-17 | End: 2023-02-27

## 2022-05-17 NOTE — PROGRESS NOTES
"Nena YOON is here for a postpartum visit after a vaginal delivery 6 weeks ago.  The depression questionnaire has been completed.  She has no signs of postpartum depression today.  She has not had a period since her delivery and is formula-feeding her infant without difficulty.  Her last Pap smear was 5/2021 and normal.  She has no history of cervical dysplasia.  She would like OCPs for contraception.    /74 (BP Location: Left arm, Patient Position: Sitting)   Ht 157.5 cm (62\")   Wt 57.6 kg (127 lb)   Breastfeeding No   BMI 23.23 kg/m²    In general pleasant female no acute distress  Neck no thyromegaly  Breasts without erythema tenderness or masses  Abdomen soft and nontender  A Pap smear was not performed.     Assessment: Normal postpartum exam.    We have discussed current Pap smear screening guidelines. I have given her a prescription for a birth control pill and we have discussed common side effects and adverse events. Nena will return in 1 year or sooner if needed.  "

## 2023-02-27 ENCOUNTER — INITIAL PRENATAL (OUTPATIENT)
Dept: OBSTETRICS AND GYNECOLOGY | Facility: CLINIC | Age: 28
End: 2023-02-27
Payer: COMMERCIAL

## 2023-02-27 VITALS — BODY MASS INDEX: 20.67 KG/M2 | WEIGHT: 113 LBS | DIASTOLIC BLOOD PRESSURE: 64 MMHG | SYSTOLIC BLOOD PRESSURE: 102 MMHG

## 2023-02-27 DIAGNOSIS — Z34.81 ENCOUNTER FOR SUPERVISION OF OTHER NORMAL PREGNANCY IN FIRST TRIMESTER: Primary | ICD-10-CM

## 2023-02-27 PROCEDURE — 0501F PRENATAL FLOW SHEET: CPT | Performed by: OBSTETRICS & GYNECOLOGY

## 2023-02-27 NOTE — PROGRESS NOTES
New OB, US ordered today, reviewed and shows 10 weeks gestation, EDC 9/23/23  Prior uncomplicated pregnancy and vaginal delivery  Having mild nausea, fatigue, but no headaches  New OB labs ordered today  Discussed OTC Unisom and B6  Discussed COVID vaccine, Tdap, and flu vaccine recommendations  ffDNA today  Discussed normal prenatal routines  Questions answered    Diagnoses and all orders for this visit:    1. Encounter for supervision of other normal pregnancy in first trimester (Primary)  -     Chlamydia trachomatis, Neisseria gonorrhoeae, PCR w/ confirmation - Urine, Urine, Clean Catch  -     ABO / Rh  -     Ambulatory Referral to MFM/Perinatology  -     Antibody Screen  -     CBC & Differential  -     Hepatitis B Surface Antigen  -     Urine Culture - Urine, Urine, Clean Catch  -     ToxASSURE Select 13 (MW) - Urine, Clean Catch  -     Rubella Antibody, IgG  -     RPR  -     HIV-1 / O / 2 Ag / Antibody 4th Generation  -     Hepatitis C Antibody  -     Kat Panorama Prenatal Test: Chromosomes 13, 18, 21, X & Y: Triploidy 22Q.11.2 Deletion - Blood,

## 2023-03-05 LAB
ABO GROUP BLD: NORMAL
BACTERIA UR CULT: ABNORMAL
BASOPHILS # BLD AUTO: 0.03 10*3/MM3 (ref 0–0.2)
BASOPHILS NFR BLD AUTO: 0.6 % (ref 0–1.5)
BLD GP AB SCN SERPL QL: NEGATIVE
C TRACH RRNA SPEC QL NAA+PROBE: NEGATIVE
DRUGS UR: NORMAL
EOSINOPHIL # BLD AUTO: 0.03 10*3/MM3 (ref 0–0.4)
EOSINOPHIL NFR BLD AUTO: 0.6 % (ref 0.3–6.2)
ERYTHROCYTE [DISTWIDTH] IN BLOOD BY AUTOMATED COUNT: 13 % (ref 12.3–15.4)
HBV SURFACE AG SERPL QL IA: NEGATIVE
HCT VFR BLD AUTO: 40.2 % (ref 34–46.6)
HCV IGG SERPL QL IA: NON REACTIVE
HGB BLD-MCNC: 13.2 G/DL (ref 12–15.9)
HIV 1+2 AB+HIV1 P24 AG SERPL QL IA: NON REACTIVE
IMM GRANULOCYTES # BLD AUTO: 0.01 10*3/MM3 (ref 0–0.05)
IMM GRANULOCYTES NFR BLD AUTO: 0.2 % (ref 0–0.5)
LYMPHOCYTES # BLD AUTO: 1.29 10*3/MM3 (ref 0.7–3.1)
LYMPHOCYTES NFR BLD AUTO: 26.8 % (ref 19.6–45.3)
Lab: NORMAL
MCH RBC QN AUTO: 28.6 PG (ref 26.6–33)
MCHC RBC AUTO-ENTMCNC: 32.8 G/DL (ref 31.5–35.7)
MCV RBC AUTO: 87 FL (ref 79–97)
MONOCYTES # BLD AUTO: 0.36 10*3/MM3 (ref 0.1–0.9)
MONOCYTES NFR BLD AUTO: 7.5 % (ref 5–12)
N GONORRHOEA RRNA SPEC QL NAA+PROBE: NEGATIVE
NEUTROPHILS # BLD AUTO: 3.09 10*3/MM3 (ref 1.7–7)
NEUTROPHILS NFR BLD AUTO: 64.3 % (ref 42.7–76)
NRBC BLD AUTO-RTO: 0 /100 WBC (ref 0–0.2)
NTRA 22Q11.2 DELETION SYNDROME POPULATION-BASED RISK TEXT: NORMAL
NTRA 22Q11.2 DELETION SYNDROME RESULT TEXT: NORMAL
NTRA 22Q11.2 DELETION SYNDROME RISK SCORE TEXT: NORMAL
NTRA FETAL FRACTION: NORMAL
NTRA GENDER OF FETUS: NORMAL
NTRA MONOSOMY X AGE-BASED RISK TEXT: NORMAL
NTRA MONOSOMY X RESULT TEXT: NORMAL
NTRA MONOSOMY X RISK SCORE TEXT: NORMAL
NTRA TRIPLOIDY RESULT TEXT: NORMAL
NTRA TRISOMY 13 AGE-BASED RISK TEXT: NORMAL
NTRA TRISOMY 13 RESULT TEXT: NORMAL
NTRA TRISOMY 13 RISK SCORE TEXT: NORMAL
NTRA TRISOMY 18 AGE-BASED RISK TEXT: NORMAL
NTRA TRISOMY 18 RESULT TEXT: NORMAL
NTRA TRISOMY 18 RISK SCORE TEXT: NORMAL
NTRA TRISOMY 21 AGE-BASED RISK TEXT: NORMAL
NTRA TRISOMY 21 RESULT TEXT: NORMAL
NTRA TRISOMY 21 RISK SCORE TEXT: NORMAL
PLATELET # BLD AUTO: 180 10*3/MM3 (ref 140–450)
RBC # BLD AUTO: 4.62 10*6/MM3 (ref 3.77–5.28)
RH BLD: POSITIVE
RPR SER QL: NON REACTIVE
RUBV IGG SERPL IA-ACNC: 1.66 INDEX
WBC # BLD AUTO: 4.81 10*3/MM3 (ref 3.4–10.8)

## 2023-03-29 ENCOUNTER — ROUTINE PRENATAL (OUTPATIENT)
Dept: OBSTETRICS AND GYNECOLOGY | Facility: CLINIC | Age: 28
End: 2023-03-29
Payer: COMMERCIAL

## 2023-03-29 VITALS — BODY MASS INDEX: 21.4 KG/M2 | SYSTOLIC BLOOD PRESSURE: 112 MMHG | WEIGHT: 117 LBS | DIASTOLIC BLOOD PRESSURE: 60 MMHG

## 2023-03-29 DIAGNOSIS — Z34.82 MULTIGRAVIDA IN SECOND TRIMESTER: Primary | ICD-10-CM

## 2023-03-29 DIAGNOSIS — B95.1 GBS (GROUP B STREPTOCOCCUS) UTI COMPLICATING PREGNANCY, SECOND TRIMESTER: ICD-10-CM

## 2023-03-29 DIAGNOSIS — O23.42 GBS (GROUP B STREPTOCOCCUS) UTI COMPLICATING PREGNANCY, SECOND TRIMESTER: ICD-10-CM

## 2023-03-29 DIAGNOSIS — Z78.9 NONSMOKER: ICD-10-CM

## 2023-03-29 DIAGNOSIS — Z3A.14 14 WEEKS GESTATION OF PREGNANCY: ICD-10-CM

## 2023-03-29 LAB
GLUCOSE UR STRIP-MCNC: NEGATIVE MG/DL
PROT UR STRIP-MCNC: ABNORMAL MG/DL

## 2023-03-29 RX ORDER — AMOXICILLIN 500 MG/1
500 TABLET, FILM COATED ORAL EVERY 8 HOURS SCHEDULED
Qty: 21 TABLET | Refills: 0 | Status: SHIPPED | OUTPATIENT
Start: 2023-03-29 | End: 2023-04-05

## 2023-03-29 NOTE — PROGRESS NOTES
Reason for visit: Routine OB visit at 14w4d. SAIDA 2023, by Ultrasound    CC:  Patient reports she is feeling well. She does have some right sided pain she experiences with lifting her 11-month old son. The pain will be sharp and short lived. Denies VB, LOF, pelvic pain, or cramping.     ROS: All systems reviewed and are negative.    Wt 117 lb for a TWG of 1.814 kg (4 lb), /60, FHTs 142  Urine today and reviewed: negative glucose, trace protein    Anatomy Scan: scheduled for 2023    Exam:  General Appearance:  Healthy appearing . Normal mood and behavior.  HEENT: NCAT, EOMI  HR str and reg. Lungs clear. Resp even and unlabored.  Abdomen is soft and nontender. Bowel sounds active. Uterus is consistent with EGA  Ext: no edema, nontender, no trauma, or cyanosis.    Impression  Diagnoses and all orders for this visit:    1. Multigravida in second trimester (Primary)  - Discussed second trimester of pregnancy, discomforts and measures of support, fetal movement, pelvic pain warnings, bleeding warnings, and signs and symptoms to report. Second Trimester of Pregnancy video included in the AVS. Round Ligament Pain education included in the AVS.  - Discussed ffDNA screening results with patient.  - Discussed and encouraged to call or come to the hospital with vaginal bleeding, leaking of fluid, pelvic pain, or any other concerns.  - Return to the office in 4 weeks for a routine OB visit with Dr. Chawla with peek ultrasound and as needed with concerns.    2. 14 weeks gestation of pregnancy    3. GBS (group b Streptococcus) UTI complicating pregnancy, second trimester  - Reviewed initial prenatal labs with patient and prescription sent for GBS bacteria in the urine.   -     amoxicillin (AMOXIL) 500 MG tablet; Take 1 tablet by mouth Every 8 (Eight) Hours for 7 days.  Dispense: 21 tablet; Refill: 0    4. Nonsmoker          This note has been signed electronically.    Stephanie Cook, DNP, APRN, CNM,  RNC-OB

## 2023-04-06 ENCOUNTER — PATIENT MESSAGE (OUTPATIENT)
Dept: OBSTETRICS AND GYNECOLOGY | Facility: CLINIC | Age: 28
End: 2023-04-06
Payer: COMMERCIAL

## 2023-04-06 DIAGNOSIS — R82.71 GROUP B STREPTOCOCCAL BACTERIURIA: Primary | ICD-10-CM

## 2023-04-07 RX ORDER — AMOXICILLIN 250 MG/5ML
500 POWDER, FOR SUSPENSION ORAL 3 TIMES DAILY
Qty: 300 ML | Refills: 0 | Status: SHIPPED | OUTPATIENT
Start: 2023-04-07

## 2023-04-25 ENCOUNTER — ROUTINE PRENATAL (OUTPATIENT)
Dept: OBSTETRICS AND GYNECOLOGY | Facility: CLINIC | Age: 28
End: 2023-04-25
Payer: COMMERCIAL

## 2023-04-25 VITALS — SYSTOLIC BLOOD PRESSURE: 94 MMHG | DIASTOLIC BLOOD PRESSURE: 62 MMHG | WEIGHT: 119 LBS | BODY MASS INDEX: 21.77 KG/M2

## 2023-04-25 DIAGNOSIS — Z34.82 ENCOUNTER FOR SUPERVISION OF OTHER NORMAL PREGNANCY IN SECOND TRIMESTER: Primary | ICD-10-CM

## 2023-04-25 LAB
GLUCOSE UR STRIP-MCNC: NEGATIVE MG/DL
PROT UR STRIP-MCNC: ABNORMAL MG/DL

## 2023-04-25 PROCEDURE — 0502F SUBSEQUENT PRENATAL CARE: CPT | Performed by: OBSTETRICS & GYNECOLOGY

## 2023-04-25 NOTE — PROGRESS NOTES
Feeling well  Reviewed normal ffDNA  Schedule anatomy US 5/4    Diagnoses and all orders for this visit:    1. Encounter for supervision of other normal pregnancy in second trimester (Primary)

## 2023-05-23 ENCOUNTER — ROUTINE PRENATAL (OUTPATIENT)
Dept: OBSTETRICS AND GYNECOLOGY | Facility: CLINIC | Age: 28
End: 2023-05-23
Payer: COMMERCIAL

## 2023-05-23 VITALS — WEIGHT: 126 LBS | BODY MASS INDEX: 23.05 KG/M2 | DIASTOLIC BLOOD PRESSURE: 72 MMHG | SYSTOLIC BLOOD PRESSURE: 116 MMHG

## 2023-05-23 DIAGNOSIS — Z34.82 ENCOUNTER FOR SUPERVISION OF OTHER NORMAL PREGNANCY IN SECOND TRIMESTER: Primary | ICD-10-CM

## 2023-05-23 LAB
GLUCOSE UR STRIP-MCNC: NEGATIVE MG/DL
PROT UR STRIP-MCNC: ABNORMAL MG/DL

## 2023-05-23 PROCEDURE — 0502F SUBSEQUENT PRENATAL CARE: CPT | Performed by: OBSTETRICS & GYNECOLOGY

## 2023-05-23 NOTE — PROGRESS NOTES
Good fetal movement  Reviewed normal anatomy US  Discussed possible dizzy spells and ensuring adequate hydration  Glucola at 28 weeks    Diagnoses and all orders for this visit:    1. Encounter for supervision of other normal pregnancy in second trimester (Primary)

## 2023-07-31 ENCOUNTER — ROUTINE PRENATAL (OUTPATIENT)
Dept: OBSTETRICS AND GYNECOLOGY | Facility: CLINIC | Age: 28
End: 2023-07-31
Payer: COMMERCIAL

## 2023-07-31 VITALS — SYSTOLIC BLOOD PRESSURE: 102 MMHG | DIASTOLIC BLOOD PRESSURE: 62 MMHG | BODY MASS INDEX: 25.24 KG/M2 | WEIGHT: 138 LBS

## 2023-07-31 DIAGNOSIS — Z34.83 ENCOUNTER FOR SUPERVISION OF OTHER NORMAL PREGNANCY IN THIRD TRIMESTER: Primary | ICD-10-CM

## 2023-07-31 PROCEDURE — 0502F SUBSEQUENT PRENATAL CARE: CPT | Performed by: OBSTETRICS & GYNECOLOGY

## 2023-08-14 ENCOUNTER — ROUTINE PRENATAL (OUTPATIENT)
Dept: OBSTETRICS AND GYNECOLOGY | Facility: CLINIC | Age: 28
End: 2023-08-14
Payer: COMMERCIAL

## 2023-08-14 VITALS — DIASTOLIC BLOOD PRESSURE: 64 MMHG | SYSTOLIC BLOOD PRESSURE: 104 MMHG | BODY MASS INDEX: 25.79 KG/M2 | WEIGHT: 141 LBS

## 2023-08-14 DIAGNOSIS — Z34.83 ENCOUNTER FOR SUPERVISION OF OTHER NORMAL PREGNANCY IN THIRD TRIMESTER: Primary | ICD-10-CM

## 2023-08-14 PROCEDURE — 0502F SUBSEQUENT PRENATAL CARE: CPT | Performed by: OBSTETRICS & GYNECOLOGY

## 2023-08-14 NOTE — PROGRESS NOTES
Good fetal movement  Labor precautions  Prior GBS + urine culture, exam next visit    Diagnoses and all orders for this visit:    1. Encounter for supervision of other normal pregnancy in third trimester (Primary)

## 2023-08-28 ENCOUNTER — ROUTINE PRENATAL (OUTPATIENT)
Dept: OBSTETRICS AND GYNECOLOGY | Facility: CLINIC | Age: 28
End: 2023-08-28
Payer: COMMERCIAL

## 2023-08-28 VITALS — SYSTOLIC BLOOD PRESSURE: 104 MMHG | DIASTOLIC BLOOD PRESSURE: 72 MMHG | WEIGHT: 145 LBS | BODY MASS INDEX: 26.52 KG/M2

## 2023-08-28 DIAGNOSIS — Z34.83 ENCOUNTER FOR SUPERVISION OF OTHER NORMAL PREGNANCY IN THIRD TRIMESTER: Primary | ICD-10-CM

## 2023-08-28 NOTE — PROGRESS NOTES
Good fetal movement  Cervix 2cm, moderate, posterior  GC/Chl ordered and done  GBS positive based on prior urine culture  Labor instructions    Diagnoses and all orders for this visit:    1. Encounter for supervision of other normal pregnancy in third trimester (Primary)  -     Chlamydia trachomatis, Neisseria gonorrhoeae, PCR w/ confirmation - Swab, Cervix

## 2023-08-30 LAB
C TRACH RRNA SPEC QL NAA+PROBE: NEGATIVE
N GONORRHOEA RRNA SPEC QL NAA+PROBE: NEGATIVE

## 2023-09-05 ENCOUNTER — ROUTINE PRENATAL (OUTPATIENT)
Dept: OBSTETRICS AND GYNECOLOGY | Facility: CLINIC | Age: 28
End: 2023-09-05
Payer: COMMERCIAL

## 2023-09-05 VITALS — DIASTOLIC BLOOD PRESSURE: 64 MMHG | SYSTOLIC BLOOD PRESSURE: 102 MMHG | WEIGHT: 147 LBS | BODY MASS INDEX: 26.89 KG/M2

## 2023-09-05 DIAGNOSIS — Z34.83 ENCOUNTER FOR SUPERVISION OF OTHER NORMAL PREGNANCY IN THIRD TRIMESTER: Primary | ICD-10-CM

## 2023-09-05 PROCEDURE — 0502F SUBSEQUENT PRENATAL CARE: CPT | Performed by: OBSTETRICS & GYNECOLOGY

## 2023-09-05 NOTE — PROGRESS NOTES
Good fetal movement  No contractions  Cervix 2/50/-3 posterior, soft  Labor instructions    Diagnoses and all orders for this visit:    1. Encounter for supervision of other normal pregnancy in third trimester (Primary)

## 2023-09-11 ENCOUNTER — ROUTINE PRENATAL (OUTPATIENT)
Dept: OBSTETRICS AND GYNECOLOGY | Facility: CLINIC | Age: 28
End: 2023-09-11
Payer: COMMERCIAL

## 2023-09-11 VITALS — BODY MASS INDEX: 27.25 KG/M2 | WEIGHT: 149 LBS | SYSTOLIC BLOOD PRESSURE: 104 MMHG | DIASTOLIC BLOOD PRESSURE: 64 MMHG

## 2023-09-11 DIAGNOSIS — Z34.83 ENCOUNTER FOR SUPERVISION OF OTHER NORMAL PREGNANCY IN THIRD TRIMESTER: Primary | ICD-10-CM

## 2023-09-11 PROCEDURE — 0502F SUBSEQUENT PRENATAL CARE: CPT | Performed by: OBSTETRICS & GYNECOLOGY

## 2023-09-11 NOTE — PROGRESS NOTES
Good fetal movement  No contractions  Cervix 3/50/-2 soft, posterior  Reviewed GBS positive  Labor instructions    Diagnoses and all orders for this visit:    1. Encounter for supervision of other normal pregnancy in third trimester (Primary)

## 2023-09-18 ENCOUNTER — ROUTINE PRENATAL (OUTPATIENT)
Dept: OBSTETRICS AND GYNECOLOGY | Facility: CLINIC | Age: 28
End: 2023-09-18
Payer: COMMERCIAL

## 2023-09-18 VITALS — SYSTOLIC BLOOD PRESSURE: 104 MMHG | WEIGHT: 149 LBS | BODY MASS INDEX: 27.25 KG/M2 | DIASTOLIC BLOOD PRESSURE: 68 MMHG

## 2023-09-18 DIAGNOSIS — Z34.83 ENCOUNTER FOR SUPERVISION OF OTHER NORMAL PREGNANCY IN THIRD TRIMESTER: Primary | ICD-10-CM

## 2023-09-18 PROCEDURE — 0502F SUBSEQUENT PRENATAL CARE: CPT | Performed by: OBSTETRICS & GYNECOLOGY

## 2023-09-18 NOTE — PROGRESS NOTES
Good fetal movement  No contractions  Reviewed GBS positive  Labor instructions    Diagnoses and all orders for this visit:    1. Encounter for supervision of other normal pregnancy in third trimester (Primary)

## 2023-09-19 ENCOUNTER — HOSPITAL ENCOUNTER (INPATIENT)
Facility: HOSPITAL | Age: 28
LOS: 3 days | Discharge: HOME | End: 2023-09-22
Attending: OBSTETRICS & GYNECOLOGY | Admitting: OBSTETRICS & GYNECOLOGY
Payer: COMMERCIAL

## 2023-09-19 PROBLEM — Z37.9 NORMAL LABOR: Status: ACTIVE | Noted: 2023-09-19

## 2023-09-19 LAB
BASOPHILS # BLD AUTO: 0.02 10*3/MM3 (ref 0–0.2)
BASOPHILS NFR BLD AUTO: 0.2 % (ref 0–1.5)
DEPRECATED RDW RBC AUTO: 41.9 FL (ref 37–54)
EOSINOPHIL # BLD AUTO: 0.05 10*3/MM3 (ref 0–0.4)
EOSINOPHIL NFR BLD AUTO: 0.6 % (ref 0.3–6.2)
ERYTHROCYTE [DISTWIDTH] IN BLOOD BY AUTOMATED COUNT: 14.6 % (ref 12.3–15.4)
HCT VFR BLD AUTO: 33.7 % (ref 34–46.6)
HGB BLD-MCNC: 10.4 G/DL (ref 12–15.9)
IMM GRANULOCYTES # BLD AUTO: 0.03 10*3/MM3 (ref 0–0.05)
IMM GRANULOCYTES NFR BLD AUTO: 0.3 % (ref 0–0.5)
LYMPHOCYTES # BLD AUTO: 1.6 10*3/MM3 (ref 0.7–3.1)
LYMPHOCYTES NFR BLD AUTO: 17.7 % (ref 19.6–45.3)
MCH RBC QN AUTO: 25.3 PG (ref 26.6–33)
MCHC RBC AUTO-ENTMCNC: 30.9 G/DL (ref 31.5–35.7)
MCV RBC AUTO: 82 FL (ref 79–97)
MONOCYTES # BLD AUTO: 0.52 10*3/MM3 (ref 0.1–0.9)
MONOCYTES NFR BLD AUTO: 5.7 % (ref 5–12)
NEUTROPHILS NFR BLD AUTO: 6.84 10*3/MM3 (ref 1.7–7)
NEUTROPHILS NFR BLD AUTO: 75.5 % (ref 42.7–76)
NRBC BLD AUTO-RTO: 0 /100 WBC (ref 0–0.2)
PLATELET # BLD AUTO: 183 10*3/MM3 (ref 140–450)
PMV BLD AUTO: 11.8 FL (ref 6–12)
RBC # BLD AUTO: 4.11 10*6/MM3 (ref 3.77–5.28)
WBC NRBC COR # BLD: 9.06 10*3/MM3 (ref 3.4–10.8)

## 2023-09-19 PROCEDURE — 85025 COMPLETE CBC W/AUTO DIFF WBC: CPT | Performed by: OBSTETRICS & GYNECOLOGY

## 2023-09-19 PROCEDURE — 86901 BLOOD TYPING SEROLOGIC RH(D): CPT | Performed by: OBSTETRICS & GYNECOLOGY

## 2023-09-19 PROCEDURE — 86850 RBC ANTIBODY SCREEN: CPT | Performed by: OBSTETRICS & GYNECOLOGY

## 2023-09-19 PROCEDURE — 25010000002 PENICILLIN G POTASSIUM PER 600000 UNITS: Performed by: OBSTETRICS & GYNECOLOGY

## 2023-09-19 PROCEDURE — 25010000002 MORPHINE PER 10 MG: Performed by: OBSTETRICS & GYNECOLOGY

## 2023-09-19 PROCEDURE — 86900 BLOOD TYPING SEROLOGIC ABO: CPT | Performed by: OBSTETRICS & GYNECOLOGY

## 2023-09-19 RX ORDER — SODIUM CHLORIDE 0.9 % (FLUSH) 0.9 %
10 SYRINGE (ML) INJECTION AS NEEDED
Status: DISCONTINUED | OUTPATIENT
Start: 2023-09-19 | End: 2023-09-20 | Stop reason: HOSPADM

## 2023-09-19 RX ORDER — SODIUM CHLORIDE, SODIUM LACTATE, POTASSIUM CHLORIDE, CALCIUM CHLORIDE 600; 310; 30; 20 MG/100ML; MG/100ML; MG/100ML; MG/100ML
125 INJECTION, SOLUTION INTRAVENOUS CONTINUOUS
Status: DISCONTINUED | OUTPATIENT
Start: 2023-09-20 | End: 2023-09-20

## 2023-09-19 RX ORDER — TERBUTALINE SULFATE 1 MG/ML
0.25 INJECTION, SOLUTION SUBCUTANEOUS AS NEEDED
Status: DISCONTINUED | OUTPATIENT
Start: 2023-09-19 | End: 2023-09-20 | Stop reason: HOSPADM

## 2023-09-19 RX ORDER — LIDOCAINE HYDROCHLORIDE 20 MG/ML
20 INJECTION, SOLUTION INFILTRATION; PERINEURAL ONCE AS NEEDED
Status: COMPLETED | OUTPATIENT
Start: 2023-09-19 | End: 2023-09-20

## 2023-09-19 RX ORDER — OXYTOCIN/0.9 % SODIUM CHLORIDE 30/500 ML
999 PLASTIC BAG, INJECTION (ML) INTRAVENOUS ONCE
Status: COMPLETED | OUTPATIENT
Start: 2023-09-20 | End: 2023-09-20

## 2023-09-19 RX ORDER — ACETAMINOPHEN 325 MG/1
650 TABLET ORAL EVERY 4 HOURS PRN
Status: DISCONTINUED | OUTPATIENT
Start: 2023-09-19 | End: 2023-09-20 | Stop reason: HOSPADM

## 2023-09-19 RX ORDER — METHYLERGONOVINE MALEATE 0.2 MG/ML
200 INJECTION INTRAVENOUS ONCE AS NEEDED
Status: DISCONTINUED | OUTPATIENT
Start: 2023-09-19 | End: 2023-09-20 | Stop reason: HOSPADM

## 2023-09-19 RX ORDER — SODIUM CHLORIDE 9 MG/ML
40 INJECTION, SOLUTION INTRAVENOUS AS NEEDED
Status: DISCONTINUED | OUTPATIENT
Start: 2023-09-19 | End: 2023-09-20 | Stop reason: HOSPADM

## 2023-09-19 RX ORDER — CARBOPROST TROMETHAMINE 250 UG/ML
250 INJECTION, SOLUTION INTRAMUSCULAR AS NEEDED
Status: DISCONTINUED | OUTPATIENT
Start: 2023-09-19 | End: 2023-09-20 | Stop reason: HOSPADM

## 2023-09-19 RX ORDER — ONDANSETRON 2 MG/ML
4 INJECTION INTRAMUSCULAR; INTRAVENOUS EVERY 6 HOURS PRN
Status: DISCONTINUED | OUTPATIENT
Start: 2023-09-19 | End: 2023-09-20 | Stop reason: SDUPTHER

## 2023-09-19 RX ORDER — MORPHINE SULFATE 2 MG/ML
2 INJECTION, SOLUTION INTRAMUSCULAR; INTRAVENOUS EVERY 4 HOURS PRN
Status: DISCONTINUED | OUTPATIENT
Start: 2023-09-19 | End: 2023-09-20 | Stop reason: HOSPADM

## 2023-09-19 RX ORDER — OXYTOCIN/0.9 % SODIUM CHLORIDE 30/500 ML
250 PLASTIC BAG, INJECTION (ML) INTRAVENOUS CONTINUOUS
Status: ACTIVE | OUTPATIENT
Start: 2023-09-20 | End: 2023-09-20

## 2023-09-19 RX ORDER — NALOXONE HCL 0.4 MG/ML
0.4 VIAL (ML) INJECTION
Status: DISCONTINUED | OUTPATIENT
Start: 2023-09-19 | End: 2023-09-20 | Stop reason: SDUPTHER

## 2023-09-19 RX ORDER — PENICILLIN G 3000000 [IU]/50ML
3 INJECTION, SOLUTION INTRAVENOUS EVERY 4 HOURS
Status: DISCONTINUED | OUTPATIENT
Start: 2023-09-20 | End: 2023-09-20 | Stop reason: HOSPADM

## 2023-09-19 RX ORDER — NALOXONE HCL 0.4 MG/ML
0.4 VIAL (ML) INJECTION
Status: DISCONTINUED | OUTPATIENT
Start: 2023-09-19 | End: 2023-09-20 | Stop reason: HOSPADM

## 2023-09-19 RX ORDER — MISOPROSTOL 200 UG/1
800 TABLET ORAL AS NEEDED
Status: DISCONTINUED | OUTPATIENT
Start: 2023-09-19 | End: 2023-09-20 | Stop reason: HOSPADM

## 2023-09-19 RX ORDER — CITRIC ACID/SODIUM CITRATE 334-500MG
30 SOLUTION, ORAL ORAL ONCE AS NEEDED
Status: DISCONTINUED | OUTPATIENT
Start: 2023-09-19 | End: 2023-09-20 | Stop reason: HOSPADM

## 2023-09-19 RX ORDER — MORPHINE SULFATE 2 MG/ML
2 INJECTION, SOLUTION INTRAMUSCULAR; INTRAVENOUS EVERY 4 HOURS PRN
Status: DISCONTINUED | OUTPATIENT
Start: 2023-09-19 | End: 2023-09-20 | Stop reason: SDUPTHER

## 2023-09-19 RX ORDER — ONDANSETRON 4 MG/1
4 TABLET, FILM COATED ORAL EVERY 6 HOURS PRN
Status: DISCONTINUED | OUTPATIENT
Start: 2023-09-19 | End: 2023-09-20 | Stop reason: SDUPTHER

## 2023-09-19 RX ORDER — SODIUM CHLORIDE 0.9 % (FLUSH) 0.9 %
10 SYRINGE (ML) INJECTION EVERY 12 HOURS SCHEDULED
Status: DISCONTINUED | OUTPATIENT
Start: 2023-09-20 | End: 2023-09-20 | Stop reason: HOSPADM

## 2023-09-19 RX ADMIN — SODIUM CHLORIDE 5 MILLION UNITS: 900 INJECTION INTRAVENOUS at 23:36

## 2023-09-19 RX ADMIN — SODIUM CHLORIDE, POTASSIUM CHLORIDE, SODIUM LACTATE AND CALCIUM CHLORIDE 1000 ML: 600; 310; 30; 20 INJECTION, SOLUTION INTRAVENOUS at 23:35

## 2023-09-19 RX ADMIN — MORPHINE SULFATE 2 MG: 2 INJECTION, SOLUTION INTRAMUSCULAR; INTRAVENOUS at 23:49

## 2023-09-20 LAB
ABO GROUP BLD: NORMAL
BLD GP AB SCN SERPL QL: NEGATIVE
HCT VFR BLD AUTO: 32.5 % (ref 34–46.6)
HGB BLD-MCNC: 9.9 G/DL (ref 12–15.9)
RH BLD: POSITIVE
T&S EXPIRATION DATE: NORMAL

## 2023-09-20 PROCEDURE — 0HQ9XZZ REPAIR PERINEUM SKIN, EXTERNAL APPROACH: ICD-10-PCS | Performed by: OBSTETRICS & GYNECOLOGY

## 2023-09-20 PROCEDURE — 85018 HEMOGLOBIN: CPT | Performed by: OBSTETRICS & GYNECOLOGY

## 2023-09-20 PROCEDURE — 59400 OBSTETRICAL CARE: CPT | Performed by: OBSTETRICS & GYNECOLOGY

## 2023-09-20 PROCEDURE — 85014 HEMATOCRIT: CPT | Performed by: OBSTETRICS & GYNECOLOGY

## 2023-09-20 RX ORDER — MISOPROSTOL 200 UG/1
600 TABLET ORAL ONCE AS NEEDED
Status: DISCONTINUED | OUTPATIENT
Start: 2023-09-20 | End: 2023-09-22 | Stop reason: HOSPADM

## 2023-09-20 RX ORDER — HYDROXYZINE HYDROCHLORIDE 25 MG/1
50 TABLET, FILM COATED ORAL NIGHTLY PRN
Status: DISCONTINUED | OUTPATIENT
Start: 2023-09-20 | End: 2023-09-22 | Stop reason: HOSPADM

## 2023-09-20 RX ORDER — NALOXONE HCL 0.4 MG/ML
0.4 VIAL (ML) INJECTION
Status: DISCONTINUED | OUTPATIENT
Start: 2023-09-20 | End: 2023-09-22 | Stop reason: HOSPADM

## 2023-09-20 RX ORDER — HYDROCORTISONE 25 MG/G
1 CREAM TOPICAL AS NEEDED
Status: DISCONTINUED | OUTPATIENT
Start: 2023-09-20 | End: 2023-09-22 | Stop reason: HOSPADM

## 2023-09-20 RX ORDER — PROMETHAZINE HYDROCHLORIDE 12.5 MG/1
12.5 SUPPOSITORY RECTAL EVERY 6 HOURS PRN
Status: DISCONTINUED | OUTPATIENT
Start: 2023-09-20 | End: 2023-09-22 | Stop reason: HOSPADM

## 2023-09-20 RX ORDER — HYDROCODONE BITARTRATE AND ACETAMINOPHEN 5; 325 MG/1; MG/1
1 TABLET ORAL EVERY 4 HOURS PRN
Status: DISCONTINUED | OUTPATIENT
Start: 2023-09-20 | End: 2023-09-22 | Stop reason: HOSPADM

## 2023-09-20 RX ORDER — ONDANSETRON 2 MG/ML
4 INJECTION INTRAMUSCULAR; INTRAVENOUS EVERY 6 HOURS PRN
Status: DISCONTINUED | OUTPATIENT
Start: 2023-09-20 | End: 2023-09-22 | Stop reason: HOSPADM

## 2023-09-20 RX ORDER — CALCIUM CARBONATE 500 MG/1
2 TABLET, CHEWABLE ORAL 3 TIMES DAILY PRN
Status: DISCONTINUED | OUTPATIENT
Start: 2023-09-20 | End: 2023-09-22 | Stop reason: HOSPADM

## 2023-09-20 RX ORDER — ACETAMINOPHEN 160 MG/5ML
650 SOLUTION ORAL EVERY 6 HOURS PRN
Status: DISCONTINUED | OUTPATIENT
Start: 2023-09-20 | End: 2023-09-22 | Stop reason: HOSPADM

## 2023-09-20 RX ORDER — ONDANSETRON 4 MG/1
4 TABLET, FILM COATED ORAL EVERY 8 HOURS PRN
Status: DISCONTINUED | OUTPATIENT
Start: 2023-09-20 | End: 2023-09-22 | Stop reason: HOSPADM

## 2023-09-20 RX ORDER — PROMETHAZINE HYDROCHLORIDE 25 MG/1
25 TABLET ORAL EVERY 6 HOURS PRN
Status: DISCONTINUED | OUTPATIENT
Start: 2023-09-20 | End: 2023-09-22 | Stop reason: HOSPADM

## 2023-09-20 RX ORDER — ONDANSETRON 2 MG/ML
4 INJECTION INTRAMUSCULAR; INTRAVENOUS EVERY 6 HOURS PRN
Status: DISCONTINUED | OUTPATIENT
Start: 2023-09-20 | End: 2023-09-20 | Stop reason: HOSPADM

## 2023-09-20 RX ORDER — MORPHINE SULFATE 2 MG/ML
1 INJECTION, SOLUTION INTRAMUSCULAR; INTRAVENOUS EVERY 4 HOURS PRN
Status: DISCONTINUED | OUTPATIENT
Start: 2023-09-20 | End: 2023-09-22 | Stop reason: HOSPADM

## 2023-09-20 RX ORDER — PRENATAL VIT/IRON FUM/FOLIC AC 27MG-0.8MG
1 TABLET ORAL DAILY
Status: DISCONTINUED | OUTPATIENT
Start: 2023-09-20 | End: 2023-09-22 | Stop reason: HOSPADM

## 2023-09-20 RX ORDER — ONDANSETRON 4 MG/1
4 TABLET, FILM COATED ORAL EVERY 6 HOURS PRN
Status: DISCONTINUED | OUTPATIENT
Start: 2023-09-20 | End: 2023-09-20 | Stop reason: HOSPADM

## 2023-09-20 RX ORDER — IBUPROFEN 600 MG/1
600 TABLET ORAL EVERY 6 HOURS PRN
Status: DISCONTINUED | OUTPATIENT
Start: 2023-09-20 | End: 2023-09-20 | Stop reason: HOSPADM

## 2023-09-20 RX ORDER — ACETAMINOPHEN 325 MG/1
650 TABLET ORAL EVERY 6 HOURS PRN
Status: DISCONTINUED | OUTPATIENT
Start: 2023-09-20 | End: 2023-09-22 | Stop reason: HOSPADM

## 2023-09-20 RX ORDER — BISACODYL 10 MG
10 SUPPOSITORY, RECTAL RECTAL DAILY PRN
Status: DISCONTINUED | OUTPATIENT
Start: 2023-09-21 | End: 2023-09-22 | Stop reason: HOSPADM

## 2023-09-20 RX ORDER — HYDROCODONE BITARTRATE AND ACETAMINOPHEN 10; 325 MG/1; MG/1
1 TABLET ORAL EVERY 4 HOURS PRN
Status: DISCONTINUED | OUTPATIENT
Start: 2023-09-20 | End: 2023-09-22 | Stop reason: HOSPADM

## 2023-09-20 RX ORDER — METHYLERGONOVINE MALEATE 0.2 MG/ML
200 INJECTION INTRAVENOUS ONCE AS NEEDED
Status: DISCONTINUED | OUTPATIENT
Start: 2023-09-20 | End: 2023-09-22 | Stop reason: HOSPADM

## 2023-09-20 RX ORDER — OXYTOCIN/0.9 % SODIUM CHLORIDE 30/500 ML
125 PLASTIC BAG, INJECTION (ML) INTRAVENOUS CONTINUOUS PRN
Status: DISCONTINUED | OUTPATIENT
Start: 2023-09-20 | End: 2023-09-22 | Stop reason: HOSPADM

## 2023-09-20 RX ORDER — IBUPROFEN 600 MG/1
600 TABLET ORAL EVERY 6 HOURS PRN
Status: DISCONTINUED | OUTPATIENT
Start: 2023-09-20 | End: 2023-09-22 | Stop reason: HOSPADM

## 2023-09-20 RX ORDER — SODIUM CHLORIDE 0.9 % (FLUSH) 0.9 %
1-10 SYRINGE (ML) INJECTION AS NEEDED
Status: DISCONTINUED | OUTPATIENT
Start: 2023-09-20 | End: 2023-09-22 | Stop reason: HOSPADM

## 2023-09-20 RX ORDER — CALCIUM CARBONATE 500 MG/1
2 TABLET, CHEWABLE ORAL 3 TIMES DAILY PRN
Status: DISCONTINUED | OUTPATIENT
Start: 2023-09-20 | End: 2023-09-20 | Stop reason: HOSPADM

## 2023-09-20 RX ORDER — DOCUSATE SODIUM 100 MG/1
100 CAPSULE, LIQUID FILLED ORAL 2 TIMES DAILY
Status: DISCONTINUED | OUTPATIENT
Start: 2023-09-20 | End: 2023-09-22 | Stop reason: HOSPADM

## 2023-09-20 RX ADMIN — Medication: at 09:37

## 2023-09-20 RX ADMIN — Medication 250 ML/HR: at 00:46

## 2023-09-20 RX ADMIN — Medication 999 ML/HR: at 00:07

## 2023-09-20 RX ADMIN — LIDOCAINE HYDROCHLORIDE 20 ML: 20 INJECTION, SOLUTION INFILTRATION; PERINEURAL at 00:10

## 2023-09-20 RX ADMIN — IBUPROFEN 600 MG: 100 SUSPENSION ORAL at 22:25

## 2023-09-20 RX ADMIN — IBUPROFEN 600 MG: 600 TABLET, FILM COATED ORAL at 01:42

## 2023-09-20 NOTE — LACTATION NOTE
Mother desires to formula feed. Attempted breastfeeding for brief period with first child. Does not desire bfing this time. Affirmed decision. Formula feeding book and suppression education provided. Questions denied. Mother has bra on. Encouragement and support provided

## 2023-09-20 NOTE — PLAN OF CARE
Goal Outcome Evaluation:           Progress: improving  Outcome Evaluation: VSS, FF, ML, U1, scant lochia, denies pain, bonding well with infant.

## 2023-09-20 NOTE — PLAN OF CARE
Goal Outcome Evaluation:  Plan of Care Reviewed With: patient, spouse        Progress: improving  Outcome Evaluation: Patient arrive on 23 at 2308 with spontaneous onset of labor. Patient is now a  with delivery of a baby girl vaginally at 0003 at 39w4d. Patient has no allergies. Patient had a first degree laceration with repair and abrasions. Patient recieved first bag of Pitocin postpartum at 999 mL, second bag is currently infusing at 125 ml/hr. Patient also recieved dose of Motrin at 0142. Patient voided at 0050 and 0220, cleaned up at 0220. Patietn has been FF/ML/U1/Scant bleeding. Rating pain 4/10; VSS.

## 2023-09-20 NOTE — H&P
[facility]  Nena YOON  : 1995  MRN: [id number]  CSN: [id number]    History and Physical    Subjective   Nena YOON is a 28 y.o. year old  with an Estimated Date of Delivery: 23 currently at 39w4d presenting with regular contractions.    Prenatal care has been with Dr. Ming Chawla.  It has been benign.    OB History    Para Term  AB Living   2 1 1 0 0 1   SAB IAB Ectopic Molar Multiple Live Births   0 0 0 0 0 1      # Outcome Date GA Lbr Ye/2nd Weight Sex Delivery Anes PTL Lv   2 Current            1 Term 22 39w0d / 01:38 3070 g (6 lb 12.3 oz) M Vag-Spont EPI N KRISTEL      Name: MODESTA YOON      Apgar1: 8  Apgar5: 9     History reviewed. No pertinent past medical history.  Past Surgical History:   Procedure Laterality Date    WISDOM TOOTH EXTRACTION         Current Facility-Administered Medications:     acetaminophen (TYLENOL) tablet 650 mg, 650 mg, Oral, Q4H PRN, Holger Chawla MD    carboprost (HEMABATE) injection 250 mcg, 250 mcg, Intramuscular, PRN, Holger Chawla MD    lactated ringers infusion, 125 mL/hr, Intravenous, Continuous, Holger Chawla MD, Stopped at 23 0003    methylergonovine (METHERGINE) injection 200 mcg, 200 mcg, Intramuscular, Once PRN, Holger Chawla MD    miSOPROStol (CYTOTEC) tablet 800 mcg, 800 mcg, Rectal, PRN, Holger Chawla MD    Morphine sulfate (PF) injection 2 mg, 2 mg, Intravenous, Q4H PRN **AND** naloxone (NARCAN) injection 0.4 mg, 0.4 mg, Intravenous, Q5 Min PRN, Holger Chawla MD    Morphine sulfate (PF) injection 2 mg, 2 mg, Intravenous, Q4H PRN, 2 mg at 23 6834 **AND** naloxone (NARCAN) injection 0.4 mg, 0.4 mg, Intravenous, Q5 Min PRN, Holger Chawla MD    ondansetron (ZOFRAN) tablet 4 mg, 4 mg, Oral, Q6H PRN **OR** ondansetron (ZOFRAN) injection 4 mg, 4 mg, Intravenous, Q6H PRN, Holger Chawla MD    [COMPLETED] oxytocin (PITOCIN) 30 units in 0.9% sodium  "chloride 500 mL (premix), 999 mL/hr, Intravenous, Once, Last Rate: 999 mL/hr at 09/20/23 0007, 999 mL/hr at 09/20/23 0007 **FOLLOWED BY** oxytocin (PITOCIN) 30 units in 0.9% sodium chloride 500 mL (premix), 250 mL/hr, Intravenous, Continuous, Holger Cahwla MD    [COMPLETED] penicillin G potassium 5 Million Units in sodium chloride 0.9 % 100 mL IVPB, 5 Million Units, Intravenous, Once, 5 Million Units at 09/19/23 2336 **FOLLOWED BY** penicillin G in iso-osmotic dextrose IVPB 3 million units (premix), 3 Million Units, Intravenous, Q4H, Holger Chawla MD    Sod Citrate-Citric Acid (BICITRA) oral solution 30 mL, 30 mL, Oral, Once PRN, Holger Chawla MD    sodium chloride 0.9 % flush 10 mL, 10 mL, Intravenous, Q12H, Holger Chawla MD    sodium chloride 0.9 % flush 10 mL, 10 mL, Intravenous, PRN, Holger Chawla MD    sodium chloride 0.9 % infusion 40 mL, 40 mL, Intravenous, PRN, Holger Chawla MD    terbutaline (BRETHINE) injection 0.25 mg, 0.25 mg, Subcutaneous, PRN, Holger Chawla MD    No Known Allergies  Social History    Tobacco Use      Smoking status: Never      Smokeless tobacco: Never    Review of Systems      Objective   /71   Pulse (!) 133   Ht 157.5 cm (62\")   Wt 68.4 kg (150 lb 12.8 oz)   Breastfeeding No   BMI 27.58 kg/m²   General: well developed; well nourished  no acute distress   Heart: regular rate and rhythm   Lungs: breathing is unlabored   Abdomen: soft, non-tender; no masses   FHT's: reactive   Cervix: was checked (by RN): 8 cm / 100 % / -1   Presentation: cephalic   Contractions:  EFW by Leopold's:  EFW by recent u/s: regular           Prenatal Labs  Lab Results   Component Value Date    HGB 10.4 (L) 09/19/2023    HEPBSAG Negative 02/27/2023    ABO A 02/27/2023    RH Positive 02/27/2023    ABSCRN Negative 02/27/2023    YII9MDG1 Non Reactive 02/27/2023    HEPCVIRUSABY Non Reactive 02/27/2023    URINECX Final report (A) 02/27/2023       Current " Labs Reviewed   No data reviewed       Assessment   IUP at 39w4d  Fetus reassuring  Group B strep status: positive  Active labor     Plan   Admit to L&D, ok for epidural, PCN for GBS prophylaxis    Holger Chawla MD  9/20/2023  00:20 CDT

## 2023-09-20 NOTE — L&D DELIVERY NOTE
[facility]   Vaginal Delivery Note    Patient Name: Nena YOON  : 1995  MRN: [id number]    Date of Delivery: 2023     Diagnosis     Pre & Post-Delivery:  Intrauterine pregnancy at 39w4d  Labor status: Spontaneous Onset of Labor     Normal labor             Problem List    Transfer to Postpartum     Review the Delivery Report for details.     Delivery     Delivery: Vaginal, Spontaneous     YOB: 2023    Time of Birth:  Gestational Age 12:03 AM   39w4d     Anesthesia: None     Delivering clinician: Holger Chawla    Forceps?   No   Vacuum? No    Shoulder dystocia present: No        Delivery narrative:  Progressed to C/C/+2 and pushed well to deliver a LVIF. MARIEL to ROT with nuchal cord x 1, vigorous to mom's abdomen. Cord clamping delayed for 60 seconds. Placenta spontaneous and intact with 3VC after IV Pitocin. 1st degree perineal laceration repaired with 3-0 Vicryl Rapide. 1% Lidocaine for anes. EBL 50mL. No complications. Sponge and needle count correct.       Infant     Findings: female  infant     Infant observations: Weight: No birth weight on file.   Length:   in  Observations/Comments:        Apgars:   @ 1 minute /      @ 5 minutes   Infant Name:      Placenta & Cord         Placenta delivered  Spontaneous  at   2023 12:07 AM     Cord: 3 vessels  present.   Nuchal Cord?  yes; Number of nuchal loops present:  1    Cord blood obtained: No    Cord gases obtained:  No    Cord gas results: Venous:  No results found for: PHCVEN    Arterial:  No results found for: PHCART     Repair     Episiotomy: None     No    Lacerations: Yes  Laceration Information  Laceration Repaired?   Perineal: 1st  Yes    Periurethral:       Labial:       Sulcus:       Vaginal:       Cervical:         Suture used for repair: 3-0 Vicryl  Laceration Length: 3cm   Estimated Blood Loss:       Quantitative Blood Loss:    QBL from VAG DEL: 47 (23 0017)     Complications     none    Disposition      Mother to Mother Baby/Postpartum  in stable condition currently.  Baby to remains with mom  in stable condition currently.    Holger Chawla MD  09/20/23  00:22 CDT

## 2023-09-21 RX ADMIN — IBUPROFEN 600 MG: 100 SUSPENSION ORAL at 21:32

## 2023-09-21 NOTE — PLAN OF CARE
Goal Outcome Evaluation:           Progress: improving  Outcome Evaluation: VSS, FFMLU1 scant lochia, voiding, ambulating, prn liquid motrin used x1 this shift, formula feeding, bonding well with infant

## 2023-09-21 NOTE — PLAN OF CARE
Problem: Adult Inpatient Plan of Care  Goal: Plan of Care Review  Outcome: Ongoing, Progressing  Flowsheets (Taken 9/21/2023 1550)  Progress: improving  Plan of Care Reviewed With: patient  Outcome Evaluation: VSS WNL, FFML U1 scant lochia.  bonding well with baby   Goal Outcome Evaluation:  Plan of Care Reviewed With: patient        Progress: improving  Outcome Evaluation: VSS WNL, FFML U1 scant lochia.  bonding well with baby

## 2023-09-22 VITALS
BODY MASS INDEX: 27.75 KG/M2 | HEART RATE: 70 BPM | HEIGHT: 62 IN | OXYGEN SATURATION: 100 % | TEMPERATURE: 98 F | RESPIRATION RATE: 18 BRPM | DIASTOLIC BLOOD PRESSURE: 65 MMHG | SYSTOLIC BLOOD PRESSURE: 105 MMHG | WEIGHT: 150.8 LBS

## 2023-09-22 NOTE — DISCHARGE SUMMARY
[facility]    Alexei Herman MD  [address]  [place] [address]  [phone]      Discharge Summary     Alessio YOON  : 1995  MRN: [id number]  CSN: [id number]    Date of Admission: 2023   Date of Discharge:  2023   Delivering Physician: Holger Chawla        Admission Diagnosis: Normal labor [O80, Z37.9]   Discharge Diagnosis: Pregnancy at 39w4d - delivered       Procedures: 2023  - Vaginal, Spontaneous       Hospital Course  Patient is a 28 y.o.  who at 39w4d had a vaginal birth.  Her postpartum course was without complications.  On PPD #2 she was ready for discharge.  She had normal lochia and pain well controlled with oral medications.    Infant  female  fetus weighing 3430 g (7 lb 9 oz)   Apgars -  8 @ 1 minute /  9 @ 5 minutes.    Discharge labs  Lab Results   Component Value Date    WBC 9.06 2023    HGB 9.9 (L) 2023    HCT 32.5 (L) 2023     2023       Discharge Medications     Discharge Medications        Continue These Medications        Instructions Start Date   prenatal (CLASSIC) vitamin  tablet  Generic drug: prenatal vitamin   Oral, Daily               External Prenatal Results       Pregnancy Outside Results - Transcribed From Office Records - See Scanned Records For Details       Test Value Date Time    ABO  A  23    Rh  Positive  23    Antibody Screen  Negative  23 2330       Negative  23 0802    Varicella IgG ^ 1.7 AI 23 0832    Rubella  1.66 index 23 0802    Hgb  9.9 g/dL 23 0547       10.4 g/dL 23       10.6 g/dL 23 0756       13.2 g/dL 23 0802    Hct  32.5 % 23 0547       33.7 % 23       40.2 % 23 0802    Glucose Fasting GTT  73 mg/dL 07/10/23 0707    Glucose Tolerance Test 1 hour  165 mg/dL 07/10/23 0707    Glucose Tolerance Test 3 hour  140 mg/dL 07/10/23 0707    Gonorrhea  (discrete)  Negative  08/28/23 1426       Negative  02/27/23 0802    Chlamydia (discrete)  Negative  08/28/23 1426       Negative  02/27/23 0802    RPR  Non Reactive  02/27/23 0802    VDRL       Syphilis Antibody       HBsAg  Negative  02/27/23 0802    Herpes Simplex Virus PCR       Herpes Simplex VIrus Culture       HIV  Non Reactive  02/27/23 0802    Hep C RNA Quant PCR       Hep C Antibody  Non Reactive  02/27/23 0802    AFP       Group B Strep  Negative  03/16/22 1329    GBS Susceptibility to Clindamycin       GBS Susceptibility to Erythromycin       Fetal Fibronectin       Genetic Testing, Maternal Blood                 Drug Screening       Test Value Date Time    Urine Drug Screen       Amphetamine Screen       Barbiturate Screen       Benzodiazepine Screen       Methadone Screen       Phencyclidine Screen       Opiates Screen       THC Screen       Cocaine Screen       Propoxyphene Screen       Buprenorphine Screen       Methamphetamine Screen       Oxycodone Screen       Tricyclic Antidepressants Screen                 Legend    ^: Historical                            Discharge Disposition Home or Self Care   Condition on Discharge: good   Follow-up: 6 weeks with Denton Herman MD  9/22/2023

## 2023-09-22 NOTE — PROGRESS NOTES
"      Alexei Herman MD  [facility]  [address]  [place] [address]  Office [phone]  Fax [phone]    [facility]  Vaginal Delivery Progress Note    Subjective   Postpartum Day 2: Vaginal Delivery    The patient feels well.  Her pain is well controlled with nonsteroidal anti-inflammatory drugs and Tylenol.   She is ambulating well.  Patient describes her bleeding as thin lochia.    Breastfeeding: declines.    Objective     Vital Signs Range for the last 24 hours  Temperature: Temp:  [97.3 °F (36.3 °C)-97.9 °F (36.6 °C)] 97.9 °F (36.6 °C)   Temp Source: Temp src: Temporal   BP: BP: (102-107)/(62-67) 107/67   Pulse: Heart Rate:  [84-87] 84   Respirations: Resp:  [18] 18   SPO2: SpO2:  [97 %-98 %] 97 %   O2 Amount (l/min):     O2 Devices Device (Oxygen Therapy): room air   Weight:       Admit Height:  Height: 157.5 cm (62\")      Physical Exam:  General:  no acute distresss.  Abdomen: abdomen is soft without significant tenderness, masses, organomegaly or guarding. Fundus: appropriate, firm, non tender  Extremities: normal, atraumatic, no cyanosis, and trace edema.       Lab results reviewed:  Yes   Rubella:  No results found for: RUBELLAIGGIN Nurse Transcribed from prenatal record --  No components found for: EXTRUBELQUAL  Rh Status:    RH type   Date Value Ref Range Status   09/19/2023 Positive  Final     Immunizations:   Immunization History   Administered Date(s) Administered    Fluzone (or Fluarix & Flulaval for VFC) >6mos 10/19/2021    Hep B, Unspecified [birth year], 07/02/1996    Influenza, Unspecified 10/23/2018, 10/19/2021, 03/15/2023    MMR 09/30/1996, 07/12/2020    Tdap 01/12/2022, 07/17/2023     Lab Results (last 24 hours)       ** No results found for the last 24 hours. **            External Prenatal Results       Pregnancy Outside Results - Transcribed From Office Records - See Scanned Records For Details       Test Value Date Time    ABO  A  09/19/23 2330    Rh  " Positive  09/19/23 2330    Antibody Screen  Negative  09/19/23 2330       Negative  02/27/23 0802    Varicella IgG ^ 1.7 AI 03/20/23 0832    Rubella  1.66 index 02/27/23 0802    Hgb  9.9 g/dL 09/20/23 0547       10.4 g/dL 09/19/23 2330       10.6 g/dL 07/06/23 0756       13.2 g/dL 02/27/23 0802    Hct  32.5 % 09/20/23 0547       33.7 % 09/19/23 2330       40.2 % 02/27/23 0802    Glucose Fasting GTT  73 mg/dL 07/10/23 0707    Glucose Tolerance Test 1 hour  165 mg/dL 07/10/23 0707    Glucose Tolerance Test 3 hour  140 mg/dL 07/10/23 0707    Gonorrhea (discrete)  Negative  08/28/23 1426       Negative  02/27/23 0802    Chlamydia (discrete)  Negative  08/28/23 1426       Negative  02/27/23 0802    RPR  Non Reactive  02/27/23 0802    VDRL       Syphilis Antibody       HBsAg  Negative  02/27/23 0802    Herpes Simplex Virus PCR       Herpes Simplex VIrus Culture       HIV  Non Reactive  02/27/23 0802    Hep C RNA Quant PCR       Hep C Antibody  Non Reactive  02/27/23 0802    AFP       Group B Strep  Negative  03/16/22 1329    GBS Susceptibility to Clindamycin       GBS Susceptibility to Erythromycin       Fetal Fibronectin       Genetic Testing, Maternal Blood                 Drug Screening       Test Value Date Time    Urine Drug Screen       Amphetamine Screen       Barbiturate Screen       Benzodiazepine Screen       Methadone Screen       Phencyclidine Screen       Opiates Screen       THC Screen       Cocaine Screen       Propoxyphene Screen       Buprenorphine Screen       Methamphetamine Screen       Oxycodone Screen       Tricyclic Antidepressants Screen                 Legend    ^: Historical                            Assessment & Plan       Normal labor      Nena YOON is Day 2  post-partum    Vaginal, Spontaneous     .      Plan:  Continue current care Discharge home with standard precautions and return to clinic in 6 weeks.      Alexei Herman MD  9/22/2023  06:35 CDT

## 2023-11-02 ENCOUNTER — POSTPARTUM VISIT (OUTPATIENT)
Dept: OBSTETRICS AND GYNECOLOGY | Facility: CLINIC | Age: 28
End: 2023-11-02
Payer: COMMERCIAL

## 2023-11-02 VITALS
BODY MASS INDEX: 24.29 KG/M2 | HEIGHT: 62 IN | WEIGHT: 132 LBS | DIASTOLIC BLOOD PRESSURE: 68 MMHG | SYSTOLIC BLOOD PRESSURE: 112 MMHG

## 2023-11-02 DIAGNOSIS — Z30.011 ENCOUNTER FOR INITIAL PRESCRIPTION OF CONTRACEPTIVE PILLS: ICD-10-CM

## 2023-11-02 PROBLEM — Z37.9 NORMAL LABOR: Status: RESOLVED | Noted: 2023-09-19 | Resolved: 2023-11-02

## 2023-11-02 RX ORDER — NORETHINDRONE AND ETHINYL ESTRADIOL AND FERROUS FUMARATE 0.4-35(21)
1 KIT ORAL DAILY
Qty: 28 EACH | Refills: 12 | Status: SHIPPED | OUTPATIENT
Start: 2023-11-02

## 2023-11-02 NOTE — PROGRESS NOTES
"Nena YOON is here for a postpartum visit after a vaginal delivery 6 weeks ago.  The depression questionnaire has been completed.  She has no signs of postpartum depression today.  She has not had a period since her delivery and is formula feeding her infant.  Her last Pap smear was 5/2021 and normal.  She has no history of cervical dysplasia.  She would like OCPs for contraception.    /68 (BP Location: Left arm, Patient Position: Sitting)   Ht 157.5 cm (62\")   Wt 59.9 kg (132 lb)   Breastfeeding No   BMI 24.14 kg/m²    In general pleasant female no acute distress  Neck no thyromegaly  Abdomen soft and nontender  A Pap smear was not performed.     Assessment: Normal postpartum exam.    We have discussed current Pap smear screening guidelines. I have given her a prescription for a birth control pill and we have discussed common side effects and adverse events. Nena will return in 1 year or sooner if needed.  "

## 2023-11-30 DIAGNOSIS — N91.2 AMENORRHEA: Primary | ICD-10-CM

## 2024-01-02 ENCOUNTER — TELEPHONE (OUTPATIENT)
Dept: OBSTETRICS AND GYNECOLOGY | Facility: CLINIC | Age: 29
End: 2024-01-02
Payer: COMMERCIAL

## 2024-02-29 NOTE — NURSING NOTE
2/29/2024        RE: Shaji Markham  [address] [facility] [place] [address]        [facility]    Code Status:  DNR   Visit Type:   Chief Complaint   Patient presents with     TCU Follow Up     Facility:  [facility]) [[id number]]           HPI: Shaji Markham is a 74 year old male who I am seeing today for follow up on the TCU. Past medical history includes mixed dyslipidemia, type 2 diabetes, chronic kidney disease stage IIIb, CVA due to occlusion of the left posterior cerebral artery, multivessel CAD and hypertension.  Patient hospitalized on 1/4/2024 due to left-sided weakness and right gaze following cardiac catheterization.  Patient underwent a coronary angiogram on 1/4/2024 in the Cath Lab at [facility].  He received a heparin bolus.  After the procedure was done staff noted the patient exhibiting right gaze deviation.  He also endorsed left Heema body numbness and weakness he was transported to the ED accompanied by the neurologist.  In the ED tPA not indicated as patient had already received heparin.  CT was not indicated of LVO so no endovascular treatment was pursued.  Patient given aspirin suppository.  MRI of the brain showed restricted diffusion defect in the right hippocampal body and tail likely representing acute ischemia.  Repeat MRI of the brain 1/5/2024 demonstrated increased size of the recent right PCA territory infarct involving the hippocampus and occipital lobe with mild localized mass effect.  Possible trace SAH overlying the right middle lobe.  EEG negative.  Vascular surgery consulted for carotid disease.  Severe greater than 70% stenosis of bilateral carotid arteries.  Vascular surgery recommended aspirin with possible carotid artery revascularization in the future.  DAPT and statin therapy.  Vascular recommended 3-month follow-up with repeat carotid artery ultrasound.  Cardiothoracic surgery following.  Patient underwent coronary angiogram on 1/4/2024  Pt presents to LDR with c/o contractions.    Tori Oliver, RN     which showed multivessel CAD including 70% proximal LAD disease.  CT surgery recommended CABG versus complex percutaneous coronary intervention.  Neurology recommended at least 4 weeks before considering any intervention.  Patient with suicidal thoughts and ideation during hospitalization.  Psychiatric consulted.  Patient found to have a general disorder with depressed mood.  Worsened left-sided weakness on 1/9/2024.  Repeat head CT showed evolving stroke.  Patient with dense left hemiplegia. Therapy reporting occ. Increased spasticity.     Transitional Care Course: Today patient sitting up in bed. Family present at bedside. Pt with recent CVA with left sided hemiparesis. Pt recently started on tizanidine for spasticity from CVA. Pt is making gains in therapy.Today he is moving his LUE. He has slight muscle movement in LLE. He is receiving diathermy. Depression with anxiety. Pt continues on Lexapro. He continues with occ periods of agitation, including throwing items. He continues on Seroquel. Pt being followed by [facility]. Therapy asking for eval for Orthotics for LLE.       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  -Brain MRI showed right PCA stroke.  -Video EEG with no seizure.  -Tylenol 650 mg TID.   -Continue gabapentin 200 mg nightly.   -ST following.  -Continue tizanidine 2 mg daily.    -increased movement in stroke affected side.   -Ok for Orthotics to eval and treat.     Agitation  -stroke induced.   -Continue Seroquel to 12.5 mg BID.     Depression   -Continue Lexapro 10 mg daily.   -pt with suicidal ideation in hospital. No recent suicidal thoughts.   -[facility] following.     DANIA on CKD, stage 3.   -Baseline creatinine  1.20-1.7.  -Additional 240 ml with each med pass.     Coronary artery disease of native artery of native heart with stable angina pectoris (H24)  -Status post coronary angiogram on 1/4/2024 revealing multivessel coronary artery disease.  -CV surgery following.  Initially CABG was recommended now cardiology is planning possible PCI.   -Hold off for 4 weeks per neurology recommendations  -Continue aspirin and statin.  -Echocardiogram showed ejection fracture of 59%.  -Continue Isosorbide.     Active Ambulatory Problems     Diagnosis Date Noted     Abnormal stress test 01/04/2024     Acute ischemic right posterior cerebral artery (PCA) stroke (H) 01/04/2024     Cerebrovascular accident (CVA) due to occlusion of left posterior cerebral artery (H) 07/04/2022     Type 2 diabetes mellitus (H) 02/16/2017     Tinnitus, bilateral 04/04/2022     Stage 3b chronic kidney disease (H) 03/13/2021     Onychomycosis of right great toe 04/05/2023     NAFLD (nonalcoholic fatty liver disease) 10/09/2019     Mixed dyslipidemia 05/05/2011     Elevated lipoprotein(a) 04/06/2022     Coronary artery disease of native artery of native heart with stable angina pectoris (H24) 01/04/2024     Visceral obesity 11/13/2020     Periodontitis 12/06/2023     Resolved Ambulatory Problems     Diagnosis Date Noted     No Resolved Ambulatory Problems     No Additional Past Medical History     Allergies   Allergen Reactions     Latex Hives and Rash     Anesthetics, Lorna Rash       All Meds and Allergies reviewed in the record at the facility and is the most up-to-date.    Current Outpatient Medications   Medication Sig     acetaminophen (TYLENOL) 325 MG tablet Take 650 mg by mouth 3 times daily     aspirin 81 MG EC tablet Take 81 mg by mouth daily     atorvastatin (LIPITOR) 80 MG tablet Take 80 mg by mouth at bedtime     clopidogrel (PLAVIX) 75 MG tablet Take 75 mg by mouth daily     escitalopram (LEXAPRO) 5 MG tablet Take 5 mg by mouth daily Start 5 mg PO  "daily X 1 week then increase to 10 mg daily.     gabapentin (NEURONTIN) 100 MG capsule Take 200 mg by mouth at bedtime     isosorbide dinitrate (ISORDIL) 5 MG tablet Take 5 mg by mouth 3 times daily     metoprolol tartrate (LOPRESSOR) 25 MG tablet Take 25 mg by mouth 2 times daily     nitroGLYcerin (NITROSTAT) 0.4 MG sublingual tablet Place 0.4 mg under the tongue every 5 minutes as needed for chest pain For chest pain place 1 tablet under the tongue every 5 minutes for 3 doses. If symptoms persist 5 minutes after 1st dose call 911.     QUEtiapine (SEROQUEL) 25 MG tablet Take 12.5 mg by mouth 2 times daily     Suvorexant (BELSOMRA) 10 MG tablet Take 10 mg by mouth nightly as needed for sleep     tiZANidine (ZANAFLEX) 2 MG capsule Take 2 mg by mouth daily     No current facility-administered medications for this visit.       REVIEW OF SYSTEMS:   10 point review of systems reviewed and pertinent positives in the HPI.     PHYSICAL EXAMINATION:  Physical Exam     Vital signs: BP (!) 160/78   Pulse 72   Temp 98  F (36.7  C)   Resp 18   Ht 1.778 m (5' 10\")   Wt 75.8 kg (167 lb 3.2 oz)   SpO2 99%   BMI 23.99 kg/m    General: Alert, awake, oriented x2, lying in bed,  follows simple commands at times   HEENT:Pink conjunctiva, dry oral mucosa, left side facial droop. Speech clear.   NECK: Supple  BACK: No kyphosis of the thoracic spine  EXTREMITIES: Left side hemiparesis, no pedal edema. Increased movement in LLL and LUE.   SKIN: Warm and dry  NEUROLOGIC: See above, pulses palpable.  PSYCHIATRIC: pleasant affect.       Labs:  All labs reviewed in the nursing home record and [facility]     This note has been dictated using voice recognition software. Any grammatical or context distortions are unintentional and inherent to the software    Electronically signed by: Essence Conteh CNP       Sincerely,        Essence Conteh NP      "

## 2024-06-24 ENCOUNTER — TELEPHONE (OUTPATIENT)
Dept: OBSTETRICS AND GYNECOLOGY | Age: 29
End: 2024-06-24

## 2024-06-24 NOTE — TELEPHONE ENCOUNTER
----- Message from Lilly LANDAVERDE sent at 6/24/2024  8:36 AM CDT -----  Regarding: FW: Positive Pregnancy Test  Contact: [phone]    ----- Message -----  From: Nena YOON  Sent: 6/23/2024   6:33 PM CDT  To: [facility] Clinical Pool  Subject: Positive Pregnancy Test                          Hello! I took a pregnancy test today and it shows positive. The first day of my last period was 5/16. On Friday I had called to schedule my yearly.

## 2024-07-09 ENCOUNTER — INITIAL PRENATAL (OUTPATIENT)
Age: 29
End: 2024-07-09
Payer: COMMERCIAL

## 2024-07-09 VITALS — WEIGHT: 117 LBS | SYSTOLIC BLOOD PRESSURE: 122 MMHG | DIASTOLIC BLOOD PRESSURE: 70 MMHG | BODY MASS INDEX: 21.4 KG/M2

## 2024-07-09 DIAGNOSIS — O09.891 SHORT INTERVAL BETWEEN PREGNANCIES AFFECTING PREGNANCY IN FIRST TRIMESTER, ANTEPARTUM: ICD-10-CM

## 2024-07-09 DIAGNOSIS — Z3A.01 7 WEEKS GESTATION OF PREGNANCY: Primary | ICD-10-CM

## 2024-07-09 DIAGNOSIS — Z34.81 MULTIGRAVIDA IN FIRST TRIMESTER: ICD-10-CM

## 2024-07-09 NOTE — PROGRESS NOTES
29-year old patient arrived to initiate prenatal care.     HPI: . No LMP recorded. Patient is pregnant.  This pregnancy was a surprise but they are doing well with it. Pre-pregnancy weight of 117.    Previous prenatal history significant for:  X2  History significant for: none    The following portion of the patient's history were reviewed and updated as needed: allergies, current medications, past family history, past medical history, social history, surgical history, and problem list.    ROS: All systems reviewed and are negative with exception of the following: amenorrhea, mild occasional nausea    US ordered today, reviewed and shows IUP of 7w3d gestation and Estimated Date of Delivery: 25    Pap Smear 2021: NILM; will have it updated next appointment    Exam:  Wt: 117 lb for TWG of 0 kg (0 lb), B/P 112/70, FHTs 145   General Appearance:  healthy-appearing  Appropriate mood and behavior.  HEENT:  Neck supple, no thyroidmegaly.  Cardiorespiratory: HR str and reg. No murmur. Lungs clear. Resp even and unlabored.  Abd: Soft, nontender. No CVA tenderness.   Ext: Calves non-tender. No cyanosis or edema.    Diagnoses and all orders for this visit:    1. 7 weeks gestation of pregnancy (Primary)  Reviewed information in new OB packet, including OTC medications for use during pregnancy, first trimester of pregnancy and discomforts, regular OB routine, ffDNA/chromosomal risk and maternal carrier screening testing.  Advised to maintain regular activity and/or exercise. Discussed bleeding and pelvic pain warnings and other signs to report. Discussed and ordered initial prenatal labs today. First Trimester of Pregnancy video and morning sickness included in AVS.     2. Multigravida in first trimester  -     ABO / Rh  -     Ambulatory Referral to MFM/Perinatology  -     Antibody Screen  -     CBC & Differential  -     Chlamydia trachomatis, Neisseria gonorrhoeae, PCR w/ confirmation - Urine, Urine,  Clean Catch  -     ToxASSURE Select 13 (MW) - Urine, Clean Catch  -     HCV Antibody Rfx To Qnt PCR  -     Hepatitis B Surface Antigen  -     HIV-1 / O / 2 Ag / Antibody  -     RPR, Rfx Qn RPR / Confirm TP  -     Rubella Antibody, IgG  -     Urine Culture - , Urine, Clean Catch  -     Varicella Zoster Antibody, IgG    3. Short interval between pregnancies affecting pregnancy in first trimester, antepartum      Return to the office in 4 weeks for routine follow up and as needed with concerns.    This note has been signed electronically.   Anamaria BRIGGSM APRN

## 2024-07-16 LAB
ABO GROUP BLD: NORMAL
BACTERIA UR CULT: NORMAL
BACTERIA UR CULT: NORMAL
BASOPHILS # BLD AUTO: 0 X10E3/UL (ref 0–0.2)
BASOPHILS NFR BLD AUTO: 1 %
BLD GP AB SCN SERPL QL: NEGATIVE
C TRACH RRNA SPEC QL NAA+PROBE: NEGATIVE
DRUGS UR: NORMAL
EOSINOPHIL # BLD AUTO: 0 X10E3/UL (ref 0–0.4)
EOSINOPHIL NFR BLD AUTO: 1 %
ERYTHROCYTE [DISTWIDTH] IN BLOOD BY AUTOMATED COUNT: 12.9 % (ref 11.7–15.4)
HBV SURFACE AG SERPL QL IA: NEGATIVE
HCT VFR BLD AUTO: 38 % (ref 34–46.6)
HCV AB SERPL QL IA: NORMAL
HCV IGG SERPL QL IA: NON REACTIVE
HGB BLD-MCNC: 12.5 G/DL (ref 11.1–15.9)
HIV 1+2 AB+HIV1 P24 AG SERPL QL IA: NON REACTIVE
IMM GRANULOCYTES # BLD AUTO: 0 X10E3/UL (ref 0–0.1)
IMM GRANULOCYTES NFR BLD AUTO: 0 %
LYMPHOCYTES # BLD AUTO: 1.3 X10E3/UL (ref 0.7–3.1)
LYMPHOCYTES NFR BLD AUTO: 28 %
MCH RBC QN AUTO: 28.5 PG (ref 26.6–33)
MCHC RBC AUTO-ENTMCNC: 32.9 G/DL (ref 31.5–35.7)
MCV RBC AUTO: 87 FL (ref 79–97)
MONOCYTES # BLD AUTO: 0.3 X10E3/UL (ref 0.1–0.9)
MONOCYTES NFR BLD AUTO: 6 %
N GONORRHOEA RRNA SPEC QL NAA+PROBE: NEGATIVE
NEUTROPHILS # BLD AUTO: 2.9 X10E3/UL (ref 1.4–7)
NEUTROPHILS NFR BLD AUTO: 64 %
PLATELET # BLD AUTO: 177 X10E3/UL (ref 150–450)
RBC # BLD AUTO: 4.38 X10E6/UL (ref 3.77–5.28)
RH BLD: POSITIVE
RPR SER QL: NON REACTIVE
RUBV IGG SERPL IA-ACNC: 1.46 INDEX
VZV IGG SER IA-ACNC: >4000 INDEX
WBC # BLD AUTO: 4.6 X10E3/UL (ref 3.4–10.8)

## 2024-08-05 ENCOUNTER — ROUTINE PRENATAL (OUTPATIENT)
Age: 29
End: 2024-08-05
Payer: COMMERCIAL

## 2024-08-05 VITALS — DIASTOLIC BLOOD PRESSURE: 70 MMHG | BODY MASS INDEX: 22.31 KG/M2 | WEIGHT: 122 LBS | SYSTOLIC BLOOD PRESSURE: 102 MMHG

## 2024-08-05 DIAGNOSIS — Z34.81 ENCOUNTER FOR SUPERVISION OF OTHER NORMAL PREGNANCY IN FIRST TRIMESTER: Primary | ICD-10-CM

## 2024-08-05 PROCEDURE — 0502F SUBSEQUENT PRENATAL CARE: CPT | Performed by: OBSTETRICS & GYNECOLOGY

## 2024-08-05 RX ORDER — PRENATAL VIT NO.126/IRON/FOLIC 28MG-0.8MG
TABLET ORAL DAILY
COMMUNITY

## 2024-08-05 NOTE — PROGRESS NOTES
Feeling well, mild nausea  Reviewed dating ultrasound  Reviewed normal prenatal labs  Due for Pap but no history of abnormal, so will do postpartum  Declines ffDNA for now    Diagnoses and all orders for this visit:    1. Encounter for supervision of other normal pregnancy in first trimester (Primary)  -     ToxASSURE Select 13 (MW) - Urine, Clean Catch

## 2024-08-11 LAB — DRUGS UR: NORMAL

## 2024-09-09 ENCOUNTER — ROUTINE PRENATAL (OUTPATIENT)
Age: 29
End: 2024-09-09
Payer: COMMERCIAL

## 2024-09-09 VITALS — DIASTOLIC BLOOD PRESSURE: 62 MMHG | WEIGHT: 123 LBS | SYSTOLIC BLOOD PRESSURE: 102 MMHG | BODY MASS INDEX: 22.5 KG/M2

## 2024-09-09 DIAGNOSIS — Z34.82 ENCOUNTER FOR SUPERVISION OF OTHER NORMAL PREGNANCY IN SECOND TRIMESTER: Primary | ICD-10-CM

## 2024-09-09 PROCEDURE — 0502F SUBSEQUENT PRENATAL CARE: CPT | Performed by: OBSTETRICS & GYNECOLOGY

## 2024-09-09 NOTE — PROGRESS NOTES
Feeling well  Normal prenatal labs  Schedule anatomy US 10/7    Diagnoses and all orders for this visit:    1. Encounter for supervision of other normal pregnancy in second trimester (Primary)

## 2024-10-07 ENCOUNTER — ROUTINE PRENATAL (OUTPATIENT)
Age: 29
End: 2024-10-07
Payer: COMMERCIAL

## 2024-10-07 VITALS — DIASTOLIC BLOOD PRESSURE: 68 MMHG | SYSTOLIC BLOOD PRESSURE: 106 MMHG | WEIGHT: 130 LBS | BODY MASS INDEX: 23.78 KG/M2

## 2024-10-07 DIAGNOSIS — Z34.82 ENCOUNTER FOR SUPERVISION OF OTHER NORMAL PREGNANCY IN SECOND TRIMESTER: Primary | ICD-10-CM

## 2024-10-07 PROCEDURE — 0502F SUBSEQUENT PRENATAL CARE: CPT | Performed by: OBSTETRICS & GYNECOLOGY

## 2024-10-07 NOTE — PROGRESS NOTES
Good fetal movement  Had anatomy US earlier today, patient reports EIF  Discussed possible dizzy spells and ensuring adequate hydration  Offered flu vaccine    Diagnoses and all orders for this visit:    1. Encounter for supervision of other normal pregnancy in second trimester (Primary)

## 2024-11-04 ENCOUNTER — ROUTINE PRENATAL (OUTPATIENT)
Age: 29
End: 2024-11-04
Payer: COMMERCIAL

## 2024-11-04 VITALS — SYSTOLIC BLOOD PRESSURE: 102 MMHG | WEIGHT: 133 LBS | BODY MASS INDEX: 24.33 KG/M2 | DIASTOLIC BLOOD PRESSURE: 64 MMHG

## 2024-11-04 DIAGNOSIS — Z34.82 ENCOUNTER FOR SUPERVISION OF OTHER NORMAL PREGNANCY IN SECOND TRIMESTER: Primary | ICD-10-CM

## 2024-11-04 PROCEDURE — 0502F SUBSEQUENT PRENATAL CARE: CPT | Performed by: OBSTETRICS & GYNECOLOGY

## 2024-11-04 NOTE — PROGRESS NOTES
Good fetal movement  Reviewed anatomy US report, EIF noted  Glucola and Hgb ordered for next visit  Had flu vaccine at work    Diagnoses and all orders for this visit:    1. Encounter for supervision of other normal pregnancy in second trimester (Primary)

## 2024-12-02 ENCOUNTER — ROUTINE PRENATAL (OUTPATIENT)
Age: 29
End: 2024-12-02
Payer: COMMERCIAL

## 2024-12-02 VITALS — SYSTOLIC BLOOD PRESSURE: 102 MMHG | BODY MASS INDEX: 25.61 KG/M2 | WEIGHT: 140 LBS | DIASTOLIC BLOOD PRESSURE: 58 MMHG

## 2024-12-02 DIAGNOSIS — Z34.83 ENCOUNTER FOR SUPERVISION OF OTHER NORMAL PREGNANCY IN THIRD TRIMESTER: Primary | ICD-10-CM

## 2024-12-02 PROCEDURE — 0502F SUBSEQUENT PRENATAL CARE: CPT | Performed by: OBSTETRICS & GYNECOLOGY

## 2024-12-02 NOTE — PROGRESS NOTES
Good fetal movement  Glucola and Hgb today, Rh positive  Discuss Tdap next visit  Discussed Joselito Gomez contractions    Diagnoses and all orders for this visit:    1. Encounter for supervision of other normal pregnancy in third trimester (Primary)  -     Gestational Screen 1 Hr ([facility])  -     Hemoglobin  -     RPR, Rfx Qn RPR / Confirm TP ([facility])

## 2024-12-03 LAB
GLUCOSE 1H P 50 G GLC PO SERPL-MCNC: 159 MG/DL (ref 70–139)
HGB BLD-MCNC: 10 G/DL (ref 11.1–15.9)
RPR SER QL: NON REACTIVE

## 2024-12-04 DIAGNOSIS — O99.810 ABNORMAL GLUCOSE TOLERANCE IN PREGNANCY: Primary | ICD-10-CM

## 2024-12-12 LAB
GLUCOSE 1H P 100 G GLC PO SERPL-MCNC: 165 MG/DL (ref 70–179)
GLUCOSE 2H P 100 G GLC PO SERPL-MCNC: 171 MG/DL (ref 70–154)
GLUCOSE 3H P 100 G GLC PO SERPL-MCNC: 162 MG/DL (ref 70–139)
GLUCOSE P FAST SERPL-MCNC: 66 MG/DL (ref 70–94)
Lab: ABNORMAL

## 2024-12-16 ENCOUNTER — ROUTINE PRENATAL (OUTPATIENT)
Age: 29
End: 2024-12-16
Payer: COMMERCIAL

## 2024-12-16 VITALS — WEIGHT: 142 LBS | DIASTOLIC BLOOD PRESSURE: 64 MMHG | SYSTOLIC BLOOD PRESSURE: 112 MMHG | BODY MASS INDEX: 25.97 KG/M2

## 2024-12-16 DIAGNOSIS — Z34.83 ENCOUNTER FOR SUPERVISION OF OTHER NORMAL PREGNANCY IN THIRD TRIMESTER: ICD-10-CM

## 2024-12-16 DIAGNOSIS — O99.810 ABNORMAL GLUCOSE TOLERANCE IN PREGNANCY: Primary | ICD-10-CM

## 2024-12-16 NOTE — PROGRESS NOTES
Good fetal movement  No contractions, taking TUMs reflux  Reviewed elevated 3 hour Glucola, will start checking FSBS  Iron supplement for Hgb 10  Tdap in office today   labor precautions    Diagnoses and all orders for this visit:    1. Abnormal glucose tolerance in pregnancy (Primary)    2. Encounter for supervision of other normal pregnancy in third trimester  -     Tdap Vaccine Greater Than or Equal To 6yo IM

## 2024-12-17 RX ORDER — BLOOD-GLUCOSE METER
1 KIT MISCELLANEOUS DAILY
Qty: 1 KIT | Refills: 0 | Status: SHIPPED | OUTPATIENT
Start: 2024-12-17

## 2024-12-17 RX ORDER — BLOOD-GLUCOSE METER
KIT MISCELLANEOUS
Qty: 150 EACH | Refills: 6 | Status: SHIPPED | OUTPATIENT
Start: 2024-12-17

## 2024-12-17 RX ORDER — LANCETS 28 GAUGE
EACH MISCELLANEOUS
Qty: 150 EACH | Refills: 6 | Status: SHIPPED | OUTPATIENT
Start: 2024-12-17

## 2024-12-31 ENCOUNTER — ROUTINE PRENATAL (OUTPATIENT)
Age: 29
End: 2024-12-31
Payer: COMMERCIAL

## 2024-12-31 VITALS — WEIGHT: 141 LBS | BODY MASS INDEX: 25.79 KG/M2 | DIASTOLIC BLOOD PRESSURE: 64 MMHG | SYSTOLIC BLOOD PRESSURE: 112 MMHG

## 2024-12-31 DIAGNOSIS — O24.410 DIET CONTROLLED GESTATIONAL DIABETES MELLITUS (GDM) IN THIRD TRIMESTER: Primary | ICD-10-CM

## 2024-12-31 DIAGNOSIS — Z34.83 ENCOUNTER FOR SUPERVISION OF OTHER NORMAL PREGNANCY IN THIRD TRIMESTER: ICD-10-CM

## 2024-12-31 NOTE — PROGRESS NOTES
Good fetal movement  No contractions, no reflux  Reviewed normal FSBS  RSV vaccine today   labor precautions    Diagnoses and all orders for this visit:    1. Diet controlled gestational diabetes mellitus (GDM) in third trimester (Primary)    2. Encounter for supervision of other normal pregnancy in third trimester  -     ABRYSVO RSV Vaccine (Adults 60+, pregnant women 32-36 wks)

## 2025-01-13 ENCOUNTER — ROUTINE PRENATAL (OUTPATIENT)
Age: 30
End: 2025-01-13
Payer: COMMERCIAL

## 2025-01-13 VITALS — BODY MASS INDEX: 26.83 KG/M2 | SYSTOLIC BLOOD PRESSURE: 106 MMHG | DIASTOLIC BLOOD PRESSURE: 68 MMHG | WEIGHT: 146.7 LBS

## 2025-01-13 DIAGNOSIS — O24.410 DIET CONTROLLED GESTATIONAL DIABETES MELLITUS (GDM) IN THIRD TRIMESTER: Primary | ICD-10-CM

## 2025-01-13 DIAGNOSIS — Z34.83 ENCOUNTER FOR SUPERVISION OF OTHER NORMAL PREGNANCY IN THIRD TRIMESTER: ICD-10-CM

## 2025-01-13 NOTE — PROGRESS NOTES
Good fetal movement  Labor precautions  GBS and cx's next visit    Diagnoses and all orders for this visit:    1. Diet controlled gestational diabetes mellitus (GDM) in third trimester (Primary)    2. Encounter for supervision of other normal pregnancy in third trimester

## 2025-01-27 ENCOUNTER — ROUTINE PRENATAL (OUTPATIENT)
Age: 30
End: 2025-01-27
Payer: COMMERCIAL

## 2025-01-27 VITALS — BODY MASS INDEX: 27.25 KG/M2 | WEIGHT: 149 LBS | SYSTOLIC BLOOD PRESSURE: 102 MMHG | DIASTOLIC BLOOD PRESSURE: 64 MMHG

## 2025-01-27 DIAGNOSIS — O24.410 DIET CONTROLLED GESTATIONAL DIABETES MELLITUS (GDM) IN THIRD TRIMESTER: Primary | ICD-10-CM

## 2025-01-27 DIAGNOSIS — Z3A.36 36 WEEKS GESTATION OF PREGNANCY: ICD-10-CM

## 2025-01-27 NOTE — PROGRESS NOTES
Easton Nixon  1943 is a 81 year old male.    Chief Complaint   Patient presents with    Follow - Up     Med yr check        HPI:    BP check  Current Outpatient Medications   Medication Sig Dispense Refill    allopurinol 100 MG Oral Tab Take 1 tablet (100 mg total) by mouth daily. 90 tablet 0    atenolol 100 MG Oral Tab Take 1 tablet (100 mg total) by mouth daily. 90 tablet 0    hydrALAZINE 50 MG Oral Tab Take 1 tablet (50 mg total) by mouth 2 (two) times daily. 180 tablet 0    felodipine ER 5 MG Oral Tablet 24 Hr Take 1 tablet (5 mg total) by mouth 2 (two) times daily. 180 tablet 0    latanoprost (XALATAN) 0.005 % Ophthalmic Solution Place 1 drop into both eyes nightly.        Past Medical History:    Abnormal stress test    BPH (benign prostatic hyperplasia)    Elevated serum creatinine    w/u     Gout    Kidney cysts    Leonel    Unspecified essential hypertension      Social History:  Social History     Socioeconomic History    Marital status:    Tobacco Use    Smoking status: Never    Smokeless tobacco: Never   Vaping Use    Vaping status: Never Used   Substance and Sexual Activity    Alcohol use: Never    Drug use: Never        REVIEW OF SYSTEMS:   Cardiovascular:   Syncope none. Rapid heart beat at rest no. Change in exercise tolerance no. Chest pain no. Chest pain while awake none. Cold extremities no. Dizziness no. Dyspnea on exertion none. Fainting none. Fatigue no. High blood pressure on medication(s). Irregular heart beat no. Leg edema no. Murmurs no. Orthopnea no.      EXAM:   /80 (BP Location: Left arm, Patient Position: Sitting, Cuff Size: adult)   Pulse 82   Temp 97 °F (36.1 °C) (Temporal)   Resp 16   Ht 5' 9\" (1.753 m)   Wt 235 lb (106.6 kg)   SpO2 98%   BMI 34.70 kg/m²   HEENT:   jvp not raised.   Ear canals: normal.   Ear drums: normal .   Ears: unremarkable.   Mouth: unremarkable.   Nasal septum: midline.   Pharynx: normal.   Sinuses: non-tender.   HEART:   Clicks:  Good fetal movement  Has had a few contractions  FSBS normal  Cervix 1-2/30/-3 moderate, posterior  GBS and GC/Chl ordered and done  Labor instructions    Diagnoses and all orders for this visit:    1. Diet controlled gestational diabetes mellitus (GDM) in third trimester (Primary)    2. 36 weeks gestation of pregnancy  -     Chlamydia trachomatis, Neisseria gonorrhoeae, PCR w/ confirmation - Swab, Cervix  -     Strep B Screen - Swab, Vaginal/Rectum         no.   Distal Pulses Palpable: yes.   Edema: trace   Gallop: no .   Heart sounds: normal S1S2.   Murmurs: none.   Rhythm: regular.   LUNGS:   Airflow: normal air movement.   Auscultation: no wheezing/rhonchi/rales.   Breath sounds bilaterally: symmetrical.       ASSESSMENT AND PLAN:   Easton was seen today for follow - up.    Diagnoses and all orders for this visit:    Primary hypertension    Stage 3a chronic kidney disease (HCC)  -     Basic Metabolic Panel (8); Future    Prediabetes  -     Hemoglobin A1C; Future    Abnormal thyroid blood test  -     TSH and Free T4 [E]; Future        Patient Instructions   Continue present management.  Pending blood work     The patient indicates understanding of these issues and agrees to the plan.  The patient is asked to Return in about 6 months (around 1/11/2025)..    Carlos Doe MD

## 2025-02-03 ENCOUNTER — ROUTINE PRENATAL (OUTPATIENT)
Age: 30
End: 2025-02-03
Payer: COMMERCIAL

## 2025-02-03 VITALS — DIASTOLIC BLOOD PRESSURE: 64 MMHG | WEIGHT: 144 LBS | BODY MASS INDEX: 26.34 KG/M2 | SYSTOLIC BLOOD PRESSURE: 102 MMHG

## 2025-02-03 DIAGNOSIS — Z3A.37 37 WEEKS GESTATION OF PREGNANCY: ICD-10-CM

## 2025-02-03 DIAGNOSIS — O24.410 DIET CONTROLLED GESTATIONAL DIABETES MELLITUS (GDM) IN THIRD TRIMESTER: Primary | ICD-10-CM

## 2025-02-03 NOTE — PROGRESS NOTES
Good fetal movement  No contractions  FSBS normal  Reviewed GBS negative  Labor instructions    Diagnoses and all orders for this visit:    1. Diet controlled gestational diabetes mellitus (GDM) in third trimester (Primary)    2. 37 weeks gestation of pregnancy

## 2025-02-10 ENCOUNTER — ROUTINE PRENATAL (OUTPATIENT)
Age: 30
End: 2025-02-10
Payer: COMMERCIAL

## 2025-02-10 VITALS — SYSTOLIC BLOOD PRESSURE: 102 MMHG | BODY MASS INDEX: 26.7 KG/M2 | WEIGHT: 146 LBS | DIASTOLIC BLOOD PRESSURE: 64 MMHG

## 2025-02-10 DIAGNOSIS — O24.410 DIET CONTROLLED GESTATIONAL DIABETES MELLITUS (GDM) IN THIRD TRIMESTER: Primary | ICD-10-CM

## 2025-02-10 DIAGNOSIS — Z3A.38 38 WEEKS GESTATION OF PREGNANCY: ICD-10-CM

## 2025-02-10 NOTE — PROGRESS NOTES
Good fetal movement  Has URI sx's, discussed OTC medication  FSBS normal  No contractions  Reviewed GBS negative  Labor instructions    Diagnoses and all orders for this visit:    1. Diet controlled gestational diabetes mellitus (GDM) in third trimester (Primary)    2. 38 weeks gestation of pregnancy

## 2025-02-16 ENCOUNTER — ANESTHESIA (OUTPATIENT)
Dept: LABOR AND DELIVERY | Facility: HOSPITAL | Age: 30
End: 2025-02-16
Payer: COMMERCIAL

## 2025-02-16 ENCOUNTER — ANESTHESIA EVENT (OUTPATIENT)
Dept: LABOR AND DELIVERY | Facility: HOSPITAL | Age: 30
End: 2025-02-16
Payer: COMMERCIAL

## 2025-02-16 ENCOUNTER — HOSPITAL ENCOUNTER (INPATIENT)
Facility: HOSPITAL | Age: 30
LOS: 2 days | Discharge: HOME | End: 2025-02-18
Attending: OBSTETRICS & GYNECOLOGY | Admitting: OBSTETRICS & GYNECOLOGY
Payer: COMMERCIAL

## 2025-02-16 PROBLEM — Z3A.39 39 WEEKS GESTATION OF PREGNANCY: Status: ACTIVE | Noted: 2025-02-16

## 2025-02-16 PROBLEM — R10.2 PELVIC PAIN IN PREGNANCY, ANTEPARTUM, THIRD TRIMESTER: Status: ACTIVE | Noted: 2025-02-16

## 2025-02-16 PROBLEM — O26.893 PELVIC PAIN IN PREGNANCY, ANTEPARTUM, THIRD TRIMESTER: Status: ACTIVE | Noted: 2025-02-16

## 2025-02-16 PROBLEM — Z37.9 NORMAL LABOR: Status: ACTIVE | Noted: 2025-02-16

## 2025-02-16 PROBLEM — O23.40 UTI (URINARY TRACT INFECTION) DURING PREGNANCY: Status: ACTIVE | Noted: 2025-02-16

## 2025-02-16 LAB
ABO GROUP BLD: NORMAL
BACTERIA UR QL AUTO: ABNORMAL /HPF
BASOPHILS # BLD AUTO: 0.04 10*3/MM3 (ref 0–0.2)
BASOPHILS NFR BLD AUTO: 0.4 % (ref 0–1.5)
BILIRUB UR QL STRIP: NEGATIVE
BLD GP AB SCN SERPL QL: NEGATIVE
CLARITY UR: CLEAR
COLOR UR: YELLOW
DEPRECATED RDW RBC AUTO: 42.3 FL (ref 37–54)
EOSINOPHIL # BLD AUTO: 0.07 10*3/MM3 (ref 0–0.4)
EOSINOPHIL NFR BLD AUTO: 0.6 % (ref 0.3–6.2)
ERYTHROCYTE [DISTWIDTH] IN BLOOD BY AUTOMATED COUNT: 15.4 % (ref 12.3–15.4)
GLUCOSE BLDC GLUCOMTR-MCNC: 79 MG/DL (ref 70–130)
GLUCOSE BLDC GLUCOMTR-MCNC: 96 MG/DL (ref 70–130)
GLUCOSE UR STRIP-MCNC: NEGATIVE MG/DL
HCT VFR BLD AUTO: 30.9 % (ref 34–46.6)
HGB BLD-MCNC: 9.3 G/DL (ref 12–15.9)
HGB UR QL STRIP.AUTO: NEGATIVE
HYALINE CASTS UR QL AUTO: ABNORMAL /LPF
IMM GRANULOCYTES # BLD AUTO: 0.05 10*3/MM3 (ref 0–0.05)
IMM GRANULOCYTES NFR BLD AUTO: 0.5 % (ref 0–0.5)
KETONES UR QL STRIP: NEGATIVE
LEUKOCYTE ESTERASE UR QL STRIP.AUTO: ABNORMAL
LYMPHOCYTES # BLD AUTO: 1.25 10*3/MM3 (ref 0.7–3.1)
LYMPHOCYTES NFR BLD AUTO: 11.6 % (ref 19.6–45.3)
MCH RBC QN AUTO: 23.3 PG (ref 26.6–33)
MCHC RBC AUTO-ENTMCNC: 30.1 G/DL (ref 31.5–35.7)
MCV RBC AUTO: 77.3 FL (ref 79–97)
MONOCYTES # BLD AUTO: 0.63 10*3/MM3 (ref 0.1–0.9)
MONOCYTES NFR BLD AUTO: 5.8 % (ref 5–12)
NEUTROPHILS NFR BLD AUTO: 8.76 10*3/MM3 (ref 1.7–7)
NEUTROPHILS NFR BLD AUTO: 81.1 % (ref 42.7–76)
NITRITE UR QL STRIP: NEGATIVE
NRBC BLD AUTO-RTO: 0 /100 WBC (ref 0–0.2)
PH UR STRIP.AUTO: 8 [PH] (ref 5–8)
PLATELET # BLD AUTO: 188 10*3/MM3 (ref 140–450)
PMV BLD AUTO: 10.8 FL (ref 6–12)
PROT UR QL STRIP: NEGATIVE
RBC # BLD AUTO: 4 10*6/MM3 (ref 3.77–5.28)
RBC # UR STRIP: ABNORMAL /HPF
REF LAB TEST METHOD: ABNORMAL
RH BLD: POSITIVE
SP GR UR STRIP: 1.01 (ref 1–1.03)
SQUAMOUS #/AREA URNS HPF: ABNORMAL /HPF
T&S EXPIRATION DATE: NORMAL
TREPONEMA PALLIDUM IGG+IGM AB [PRESENCE] IN SERUM OR PLASMA BY IMMUNOASSAY: NORMAL
UROBILINOGEN UR QL STRIP: ABNORMAL
WBC # UR STRIP: ABNORMAL /HPF
WBC NRBC COR # BLD AUTO: 10.8 10*3/MM3 (ref 3.4–10.8)

## 2025-02-16 PROCEDURE — 82948 REAGENT STRIP/BLOOD GLUCOSE: CPT

## 2025-02-16 PROCEDURE — 86780 TREPONEMA PALLIDUM: CPT | Performed by: OBSTETRICS & GYNECOLOGY

## 2025-02-16 PROCEDURE — 81001 URINALYSIS AUTO W/SCOPE: CPT | Performed by: OBSTETRICS & GYNECOLOGY

## 2025-02-16 PROCEDURE — 25010000002 CEFTRIAXONE PER 250 MG: Performed by: OBSTETRICS & GYNECOLOGY

## 2025-02-16 PROCEDURE — 86850 RBC ANTIBODY SCREEN: CPT | Performed by: OBSTETRICS & GYNECOLOGY

## 2025-02-16 PROCEDURE — 25810000003 LACTATED RINGERS PER 1000 ML: Performed by: OBSTETRICS & GYNECOLOGY

## 2025-02-16 PROCEDURE — 59400 OBSTETRICAL CARE: CPT | Performed by: OBSTETRICS & GYNECOLOGY

## 2025-02-16 PROCEDURE — C1755 CATHETER, INTRASPINAL: HCPCS

## 2025-02-16 PROCEDURE — 88307 TISSUE EXAM BY PATHOLOGIST: CPT | Performed by: OBSTETRICS & GYNECOLOGY

## 2025-02-16 PROCEDURE — 86901 BLOOD TYPING SEROLOGIC RH(D): CPT | Performed by: OBSTETRICS & GYNECOLOGY

## 2025-02-16 PROCEDURE — 25810000003 LACTATED RINGERS SOLUTION: Performed by: OBSTETRICS & GYNECOLOGY

## 2025-02-16 PROCEDURE — 85025 COMPLETE CBC W/AUTO DIFF WBC: CPT | Performed by: OBSTETRICS & GYNECOLOGY

## 2025-02-16 PROCEDURE — 86900 BLOOD TYPING SEROLOGIC ABO: CPT | Performed by: OBSTETRICS & GYNECOLOGY

## 2025-02-16 PROCEDURE — 25010000002 FENTANYL CITRATE (PF) 50 MCG/ML SOLUTION: Performed by: OBSTETRICS & GYNECOLOGY

## 2025-02-16 PROCEDURE — 87086 URINE CULTURE/COLONY COUNT: CPT | Performed by: OBSTETRICS & GYNECOLOGY

## 2025-02-16 PROCEDURE — 25010000002 ROPIVACAINE PER 1 MG

## 2025-02-16 PROCEDURE — 99202 OFFICE O/P NEW SF 15 MIN: CPT | Performed by: OBSTETRICS & GYNECOLOGY

## 2025-02-16 RX ORDER — ACETAMINOPHEN 325 MG/1
650 TABLET ORAL EVERY 4 HOURS PRN
Status: DISCONTINUED | OUTPATIENT
Start: 2025-02-16 | End: 2025-02-16 | Stop reason: HOSPADM

## 2025-02-16 RX ORDER — OXYTOCIN/0.9 % SODIUM CHLORIDE 30/500 ML
250 PLASTIC BAG, INJECTION (ML) INTRAVENOUS CONTINUOUS
Status: ACTIVE | OUTPATIENT
Start: 2025-02-16 | End: 2025-02-16

## 2025-02-16 RX ORDER — HYDROXYZINE HYDROCHLORIDE 25 MG/1
50 TABLET, FILM COATED ORAL NIGHTLY PRN
Status: DISCONTINUED | OUTPATIENT
Start: 2025-02-16 | End: 2025-02-18 | Stop reason: HOSPADM

## 2025-02-16 RX ORDER — ONDANSETRON 2 MG/ML
4 INJECTION INTRAMUSCULAR; INTRAVENOUS EVERY 6 HOURS PRN
Status: DISCONTINUED | OUTPATIENT
Start: 2025-02-16 | End: 2025-02-16 | Stop reason: HOSPADM

## 2025-02-16 RX ORDER — SODIUM CHLORIDE, SODIUM LACTATE, POTASSIUM CHLORIDE, CALCIUM CHLORIDE 600; 310; 30; 20 MG/100ML; MG/100ML; MG/100ML; MG/100ML
125 INJECTION, SOLUTION INTRAVENOUS CONTINUOUS
Status: DISCONTINUED | OUTPATIENT
Start: 2025-02-16 | End: 2025-02-16

## 2025-02-16 RX ORDER — HYDROCORTISONE 25 MG/G
1 CREAM TOPICAL AS NEEDED
Status: DISCONTINUED | OUTPATIENT
Start: 2025-02-16 | End: 2025-02-18 | Stop reason: HOSPADM

## 2025-02-16 RX ORDER — DOCUSATE SODIUM 100 MG/1
100 CAPSULE, LIQUID FILLED ORAL 2 TIMES DAILY
Status: DISCONTINUED | OUTPATIENT
Start: 2025-02-16 | End: 2025-02-18 | Stop reason: HOSPADM

## 2025-02-16 RX ORDER — METHYLERGONOVINE MALEATE 0.2 MG/ML
200 INJECTION INTRAVENOUS ONCE AS NEEDED
Status: DISCONTINUED | OUTPATIENT
Start: 2025-02-16 | End: 2025-02-16 | Stop reason: HOSPADM

## 2025-02-16 RX ORDER — OXYTOCIN/0.9 % SODIUM CHLORIDE 30/500 ML
999 PLASTIC BAG, INJECTION (ML) INTRAVENOUS ONCE
Status: COMPLETED | OUTPATIENT
Start: 2025-02-16 | End: 2025-02-16

## 2025-02-16 RX ORDER — ONDANSETRON 4 MG/1
4 TABLET, ORALLY DISINTEGRATING ORAL EVERY 6 HOURS PRN
Status: DISCONTINUED | OUTPATIENT
Start: 2025-02-16 | End: 2025-02-16 | Stop reason: HOSPADM

## 2025-02-16 RX ORDER — IBUPROFEN 600 MG/1
600 TABLET, FILM COATED ORAL EVERY 6 HOURS
Status: DISCONTINUED | OUTPATIENT
Start: 2025-02-16 | End: 2025-02-16 | Stop reason: ALTCHOICE

## 2025-02-16 RX ORDER — OXYTOCIN/0.9 % SODIUM CHLORIDE 30/500 ML
125 PLASTIC BAG, INJECTION (ML) INTRAVENOUS ONCE AS NEEDED
Status: DISCONTINUED | OUTPATIENT
Start: 2025-02-16 | End: 2025-02-18 | Stop reason: HOSPADM

## 2025-02-16 RX ORDER — MISOPROSTOL 200 UG/1
800 TABLET ORAL ONCE AS NEEDED
Status: DISCONTINUED | OUTPATIENT
Start: 2025-02-16 | End: 2025-02-18 | Stop reason: HOSPADM

## 2025-02-16 RX ORDER — TERBUTALINE SULFATE 1 MG/ML
0.25 INJECTION SUBCUTANEOUS AS NEEDED
Status: DISCONTINUED | OUTPATIENT
Start: 2025-02-16 | End: 2025-02-16 | Stop reason: HOSPADM

## 2025-02-16 RX ORDER — PROMETHAZINE HYDROCHLORIDE 12.5 MG/1
12.5 SUPPOSITORY RECTAL EVERY 6 HOURS PRN
Status: DISCONTINUED | OUTPATIENT
Start: 2025-02-16 | End: 2025-02-16 | Stop reason: HOSPADM

## 2025-02-16 RX ORDER — EPHEDRINE SULFATE 50 MG/ML
10 INJECTION, SOLUTION INTRAVENOUS
Status: DISCONTINUED | OUTPATIENT
Start: 2025-02-16 | End: 2025-02-16 | Stop reason: HOSPADM

## 2025-02-16 RX ORDER — PRENATAL VIT/IRON FUM/FOLIC AC 27MG-0.8MG
1 TABLET ORAL DAILY
Status: DISCONTINUED | OUTPATIENT
Start: 2025-02-16 | End: 2025-02-18 | Stop reason: HOSPADM

## 2025-02-16 RX ORDER — FENTANYL CITRATE 50 UG/ML
50 INJECTION, SOLUTION INTRAMUSCULAR; INTRAVENOUS ONCE
Status: COMPLETED | OUTPATIENT
Start: 2025-02-16 | End: 2025-02-16

## 2025-02-16 RX ORDER — BISACODYL 10 MG
10 SUPPOSITORY, RECTAL RECTAL DAILY PRN
Status: DISCONTINUED | OUTPATIENT
Start: 2025-02-17 | End: 2025-02-18 | Stop reason: HOSPADM

## 2025-02-16 RX ORDER — LIDOCAINE HYDROCHLORIDE AND EPINEPHRINE 15; 5 MG/ML; UG/ML
INJECTION, SOLUTION EPIDURAL
Status: COMPLETED | OUTPATIENT
Start: 2025-02-16 | End: 2025-02-16

## 2025-02-16 RX ORDER — ONDANSETRON 2 MG/ML
4 INJECTION INTRAMUSCULAR; INTRAVENOUS EVERY 6 HOURS PRN
Status: DISCONTINUED | OUTPATIENT
Start: 2025-02-16 | End: 2025-02-18 | Stop reason: HOSPADM

## 2025-02-16 RX ORDER — FAMOTIDINE 10 MG/ML
20 INJECTION, SOLUTION INTRAVENOUS ONCE AS NEEDED
Status: DISCONTINUED | OUTPATIENT
Start: 2025-02-16 | End: 2025-02-16 | Stop reason: HOSPADM

## 2025-02-16 RX ORDER — CARBOPROST TROMETHAMINE 250 UG/ML
250 INJECTION, SOLUTION INTRAMUSCULAR
Status: DISCONTINUED | OUTPATIENT
Start: 2025-02-16 | End: 2025-02-16 | Stop reason: HOSPADM

## 2025-02-16 RX ORDER — CALCIUM CARBONATE 500 MG/1
2 TABLET, CHEWABLE ORAL 3 TIMES DAILY PRN
Status: DISCONTINUED | OUTPATIENT
Start: 2025-02-16 | End: 2025-02-16 | Stop reason: HOSPADM

## 2025-02-16 RX ORDER — ONDANSETRON 4 MG/1
4 TABLET, ORALLY DISINTEGRATING ORAL EVERY 8 HOURS PRN
Status: DISCONTINUED | OUTPATIENT
Start: 2025-02-16 | End: 2025-02-18 | Stop reason: HOSPADM

## 2025-02-16 RX ORDER — IBUPROFEN 100 MG/5ML
600 SUSPENSION ORAL EVERY 6 HOURS PRN
Status: DISCONTINUED | OUTPATIENT
Start: 2025-02-16 | End: 2025-02-18 | Stop reason: HOSPADM

## 2025-02-16 RX ORDER — ROPIVACAINE HYDROCHLORIDE 2 MG/ML
INJECTION, SOLUTION EPIDURAL; INFILTRATION; PERINEURAL AS NEEDED
Status: DISCONTINUED | OUTPATIENT
Start: 2025-02-16 | End: 2025-02-16 | Stop reason: SURG

## 2025-02-16 RX ORDER — TRAMADOL HYDROCHLORIDE 50 MG/1
50 TABLET ORAL EVERY 6 HOURS PRN
Status: DISCONTINUED | OUTPATIENT
Start: 2025-02-16 | End: 2025-02-18 | Stop reason: HOSPADM

## 2025-02-16 RX ORDER — FAMOTIDINE 10 MG/ML
20 INJECTION, SOLUTION INTRAVENOUS ONCE AS NEEDED
OUTPATIENT
Start: 2025-02-16

## 2025-02-16 RX ORDER — FAMOTIDINE 20 MG/1
20 TABLET, FILM COATED ORAL ONCE AS NEEDED
Status: DISCONTINUED | OUTPATIENT
Start: 2025-02-16 | End: 2025-02-16 | Stop reason: HOSPADM

## 2025-02-16 RX ORDER — PROMETHAZINE HYDROCHLORIDE 25 MG/1
12.5 TABLET ORAL EVERY 6 HOURS PRN
Status: DISCONTINUED | OUTPATIENT
Start: 2025-02-16 | End: 2025-02-16 | Stop reason: HOSPADM

## 2025-02-16 RX ORDER — ACETAMINOPHEN 325 MG/1
650 TABLET ORAL EVERY 6 HOURS
Status: DISCONTINUED | OUTPATIENT
Start: 2025-02-16 | End: 2025-02-16 | Stop reason: ALTCHOICE

## 2025-02-16 RX ORDER — CITRIC ACID/SODIUM CITRATE 334-500MG
30 SOLUTION, ORAL ORAL ONCE
Status: DISCONTINUED | OUTPATIENT
Start: 2025-02-16 | End: 2025-02-16 | Stop reason: HOSPADM

## 2025-02-16 RX ORDER — SODIUM CHLORIDE 0.9 % (FLUSH) 0.9 %
1-10 SYRINGE (ML) INJECTION AS NEEDED
Status: DISCONTINUED | OUTPATIENT
Start: 2025-02-16 | End: 2025-02-18 | Stop reason: HOSPADM

## 2025-02-16 RX ORDER — ACETAMINOPHEN 160 MG/5ML
650 SOLUTION ORAL EVERY 6 HOURS PRN
Status: DISCONTINUED | OUTPATIENT
Start: 2025-02-16 | End: 2025-02-18 | Stop reason: HOSPADM

## 2025-02-16 RX ADMIN — BENZOCAINE AND LEVOMENTHOL: 200; 5 SPRAY TOPICAL at 15:28

## 2025-02-16 RX ADMIN — ROPIVACAINE HYDROCHLORIDE 6 ML/HR: 2 INJECTION, SOLUTION EPIDURAL; INFILTRATION at 08:18

## 2025-02-16 RX ADMIN — LIDOCAINE HYDROCHLORIDE AND EPINEPHRINE 3 ML: 15; 5 INJECTION, SOLUTION EPIDURAL at 08:10

## 2025-02-16 RX ADMIN — ROPIVACAINE HYDROCHLORIDE 8 ML: 2 INJECTION, SOLUTION EPIDURAL; INFILTRATION at 08:15

## 2025-02-16 RX ADMIN — CEFTRIAXONE SODIUM 2000 MG: 2 INJECTION, POWDER, FOR SOLUTION INTRAMUSCULAR; INTRAVENOUS at 05:58

## 2025-02-16 RX ADMIN — SODIUM CHLORIDE, SODIUM LACTATE, POTASSIUM CHLORIDE, CALCIUM CHLORIDE 125 ML/HR: 20; 30; 600; 310 INJECTION, SOLUTION INTRAVENOUS at 07:22

## 2025-02-16 RX ADMIN — SODIUM CHLORIDE, POTASSIUM CHLORIDE, SODIUM LACTATE AND CALCIUM CHLORIDE 1000 ML: 600; 310; 30; 20 INJECTION, SOLUTION INTRAVENOUS at 05:26

## 2025-02-16 RX ADMIN — FENTANYL CITRATE 50 MCG: 50 INJECTION, SOLUTION INTRAMUSCULAR; INTRAVENOUS at 07:19

## 2025-02-16 RX ADMIN — IBUPROFEN 600 MG: 100 SUSPENSION ORAL at 20:38

## 2025-02-16 RX ADMIN — SODIUM CHLORIDE, SODIUM LACTATE, POTASSIUM CHLORIDE, CALCIUM CHLORIDE 1000 ML: 20; 30; 600; 310 INJECTION, SOLUTION INTRAVENOUS at 08:19

## 2025-02-16 RX ADMIN — Medication 999 ML/HR: at 09:09

## 2025-02-16 NOTE — PROGRESS NOTES
Alessio YOON  : 1995  MRN: [id number]  CSN: [id number]    Labor & Delivery [facility] progress note    Subjective   Chief Complaint: Patient reports painful contractions, no leakage of fluid. Good fetal movement. GBS negative. Gestational diabetes    HPI:     History:    Prenatal Information:  Prenatal Results       Initial Prenatal Labs       Test Value Reference Range Date Time    Hemoglobin  12.5 g/dL 11.1 - 15.9 24 0733    Hematocrit  38.0 % 34.0 - 46.6 24 0733    Platelets  177 x10E3/uL 150 - 450 24 0733    Rubella IgG  1.46 index Immune >0.99 24 07    Hepatitis B SAg  Negative  Negative 24 07    Hepatitis C Ab        RPR  Non Reactive  Non Reactive 24 0818       Non Reactive  Non Reactive 24 07    T. Pallidum Ab         ABO  A   24 07    Rh  Positive   24 0733    Antibody Screen  Negative  Negative 24 0733    HIV  Non Reactive  Non Reactive 24 0733    Urine Culture  Final report   24 0733    Gonorrhea  Negative  Negative 25 0850       Negative  Negative 24 0733    Chlamydia  Negative  Negative 25 0850       Negative  Negative 24 0733    TSH        HgB A1c         Varicella IgG  >4000 index Immune >165 24 07    Hemoglobinopathy Fractionation        Hemoglobinopathy (genetic testing)        Cystic fibrosis         Spinal muscular atrophy        Fragile X                  Fetal testing        Test Value Reference Range Date Time    NIPT        MSAFP        AFP-4                  2nd and 3rd Trimester       Test Value Reference Range Date Time    Hemoglobin (repeated)  10.0 g/dL 11.1 - 15.9 24 0818    Hematocrit (repeated)        Platelets   177 x10E3/uL 150 - 450 24 0733    1 hour GTT   159 mg/dL 70 - 139 24 0818    Antibody Screen (repeated)        3rd TM syphilis scrn (repeated)  RPR   Non Reactive  Non Reactive 24 0818    3rd TM syphilis scrn (repeated)  TP-Ab        3rd TM syphilis screen TB-Ab (FTA)        Syphilis cascade test TP-Ab (EIA)        Syphilis cascade TPPA        GTT Fasting  66 mg/dL 70 - 94 12/11/24 0709    GTT 1 Hr  165 mg/dL 70 - 179 12/11/24 0709    GTT 2 Hr  171 mg/dL 70 - 154 12/11/24 0709    GTT 3 Hr  162 mg/dL 70 - 139 12/11/24 0709    Group B Strep  Negative  Negative 01/27/25 0850              Other testing        Test Value Reference Range Date Time    Parvo IgG         CMV IgG                   Drug Screening       Test Value Reference Range Date Time    Amphetamine Screen        Barbiturate Screen        Benzodiazepine Screen        Methadone Screen        Phencyclidine Screen        Opiates Screen        THC Screen        Cocaine Screen        Propoxyphene Screen        Buprenorphine Screen        Methamphetamine Screen        Oxycodone Screen        Tricyclic Antidepressants Screen                  Legend    ^: Historical                          External Prenatal Results       Pregnancy Outside Results - Transcribed From Office Records - See Scanned Records For Details       Test Value Date Time    ABO  A  07/09/24 0733    Rh  Positive  07/09/24 0733    Antibody Screen  Negative  07/09/24 0733    Varicella IgG  >4000 index 07/09/24 0733    Rubella  1.46 index 07/09/24 0733    Hgb  10.0 g/dL 12/02/24 0818       12.5 g/dL 07/09/24 0733    Hct  38.0 % 07/09/24 0733    HgB A1c        1h GTT  159 mg/dL 12/02/24 0818    3h GTT Fasting  66 mg/dL 12/11/24 0709    3h GTT 1 hour  165 mg/dL 12/11/24 0709    3h GTT 2 hour  171 mg/dL 12/11/24 0709    3h GTT 3 hour   162 mg/dL 12/11/24 0709    Gonorrhea (discrete)  Negative  01/27/25 0850       Negative  07/09/24 0733    Chlamydia (discrete)  Negative  01/27/25 0850       Negative  07/09/24 0733    RPR  Non Reactive  12/02/24 0818       Non Reactive  07/09/24 0733    Syphils cascade: TP-Ab (FTA)       TP-Ab       TP-Ab (EIA)       TPPA       HBsAg  Negative  07/09/24 0733    Herpes Simplex Virus PCR        Herpes Simplex VIrus Culture       HIV  Non Reactive  24 0733    Hep C RNA Quant PCR       Hep C Antibody       AFP       NIPT       Cystic Fibrosis (Kat)       Cystic Fibroisis        Spinal Muscular atrophy       Fragile X       Group B Strep  Negative  25 0850    GBS Susceptibility to Clindamycin       GBS Susceptibility to Erythromycin       Fetal Fibronectin       Genetic Testing, Maternal Blood                 Drug Screening       Test Value Date Time    Urine Drug Screen       Amphetamine Screen       Barbiturate Screen       Benzodiazepine Screen       Methadone Screen       Phencyclidine Screen       Opiates Screen       THC Screen       Cocaine Screen       Propoxyphene Screen       Buprenorphine Screen       Methamphetamine Screen       Oxycodone Screen       Tricyclic Antidepressants Screen                 Legend    ^: Historical                             Past OB History:     OB History    Para Term  AB Living   3 2 2 0 0 2   SAB IAB Ectopic Molar Multiple Live Births   0 0 0 0 0 2      # Outcome Date GA Lbr Ye/2nd Weight Sex Type Anes PTL Lv   3 Current            2 Term 23 39w4d 03:54 / 00:09 3430 g (7 lb 9 oz) F Vag-Spont Local N KRISTEL      Birth Comments: HC: 31.5cm      Complications: Precipitant delivery      Name: TIIK YOON      Apgar1: 8  Apgar5: 9   1 Term 22 39w0d / 01:38 3070 g (6 lb 12.3 oz) M Vag-Spont EPI N KRISTEL      Name: MODESTA YOON      Apgar1: 8  Apgar5: 9       Past Medical History: History reviewed. No pertinent past medical history.   Past Surgical History Past Surgical History:   Procedure Laterality Date    WISDOM TOOTH EXTRACTION        Family History: Family History   Problem Relation Age of Onset    Diabetes Father     Hyperthyroidism Mother     No Known Problems Sister     Breast cancer Other 60    Heart disease Maternal Grandfather     Ovarian cancer Neg Hx     Uterine cancer Neg Hx     Colon cancer Neg Hx      Melanoma Neg Hx     Prostate cancer Neg Hx       Social History:  reports that she has never smoked. She has never used smokeless tobacco.   reports no history of alcohol use.   reports no history of drug use.           High Risk Medical Conditions/Complaints this visit: Gestational diabetes    Discussion of Management or Test Interpretation with physician/provider/healthcare provider: No    External Records Reviewed: Prenatal history in [facility] from [facility] provider - LAUREEN Chawla reviewed, pregnancy complicated by: GDM    Review Previous Test Results: Prenatal labs in [facility] reviewed, history of: GDM    Independent Historian: None    Social Determinants to Health: No drug, transportation or housing or other issues noted       Objective   Medical Decision Making:  Amount/Complexity of Data Reviewed  -Labs ordered - CBC, UA  -Imaging ordered  -IV fluids given - yes  Risk:  Prescription drug management  Drug therapy requiring intensive monitoring for toxicity - IV Rocephin  Decision to admit patient - no  Diagnosis/Treatment limited by social determinants    Min/max vitals past 24 hours:  Temp  Min: 98.2 °F (36.8 °C)  Max: 98.2 °F (36.8 °C)   BP  Min: 107/61  Max: 107/61   Pulse  Min: 109  Max: 109   Resp  Min: 16  Max: 16        Independent Interpretation NST/FHT's: reassuring and category 1.  external monitors used   Cervix: was checked (by me): 1 cm / 80 % / -3   Contractions:    Review of current test: results regular    Abnormal UA       Final Diagnoses: UTI       Assessment   IUP at 39w1d  Abdominal pain  Contractions  UTI     Plan   Expectant management  Antibiotics    Digna Tong MD  2/16/2025  05:13 CST

## 2025-02-16 NOTE — L&D DELIVERY NOTE
[facility]   Vaginal Delivery Note    Patient Name: Nena YOON  : 1995  MRN: [id number]    Date of Delivery: 2025    Diagnosis     Pre & Post-Delivery:  Intrauterine pregnancy at 39w1d  Labor status: Spontaneous Onset of Labor    Normal labor    Pelvic pain in pregnancy, antepartum, third trimester    39 weeks gestation of pregnancy    UTI (urinary tract infection) during pregnancy             Problem List    Transfer to Postpartum     Review the Delivery Report for details.     Delivery     Delivery: Vaginal, Spontaneous    YOB: 2025   Time of Birth:  Gestational Age 9:04 AM  39w1d     Anesthesia: Epidural    Delivering clinician: Tapan Smith   Forceps?   No   Vacuum? No    Shoulder dystocia present: No        Delivery narrative:    The patient was noted to be completely dilated and effaced with the fetal head at the introitus. The fetal vertex was delivered cephalic spontaneously with maternal pushing efforts. No nuchal cord was identified . The right anterior shoulder was delivered atraumatically by maternal expulsive efforts with the assistance of routine downward traction. The posterior shoulder delivered with maternal expulsive efforts and routine upward traction. The remainder of the fetus delivered spontaneously. Upon delivery, the infant was noted to be vigorous and was passed to the mother's abdomen into the care of the awaiting Infant care team. Following a 60-second delay in cord clamping, the cord was clamped x2 and cut. Cord blood was obtained for analysis and blood gas analysis. During the third stage of labor, IV Pitocin was administered to enhance uterine contraction. The placenta delivered spontaneously & intact.  The cervix, vagina and perineum were inspected for lacerations. No lacerations. Hemostasis was confirmed. Cervix and vulva without lacerations. The uterine fundus was firm at the end of the procedure.  Anesthesia: epidural. Mom and baby  "tolerated the delivery well. All needle, sponge, and instrument counts were noted to be correct x2 at the end of the procedure.         Infant     Findings: male infant     Infant observations: Weight: No birth weight on file.  Length:   in  Observations/Comments:        Apgars:   @ 1 minute /      @ 5 minutes         Placenta & Cord         Placenta delivered  Spontaneous at   2/16/2025  9:08 AM    Cord:   present.   Nuchal Cord?  no   Cord blood obtained: No   Cord gases obtained:  No   Cord gas results: Venous:  No results found for: \"PHCVEN\", \"BECVEN\"    Arterial:  No results found for: \"PHCART\", \"BECART\"     Repair     Episiotomy: None    No    Lacerations: No   Estimated Blood Loss:       Quantitative Blood Loss:    QBL from VAG DEL: 92 (02/16/25 0912)     Complications     none    Disposition     Mother to Mother Baby/Postpartum  in stable condition currently.  Baby to remains with mom  in stable condition currently.    Tapan Smith MD  02/16/25  09:15 CST        "

## 2025-02-16 NOTE — PLAN OF CARE
Goal Outcome Evaluation:  Plan of Care Reviewed With: patient, spouse        Progress: improving  Outcome Evaluation: vss, ambulating and voiding, formula feeding, bonding well with infant, spouse and family supportive at bedside.

## 2025-02-16 NOTE — ANESTHESIA PREPROCEDURE EVALUATION
Anesthesia Evaluation     Patient summary reviewed and Nursing notes reviewed   no history of anesthetic complications:   NPO Solid Status: > 8 hours  NPO Liquid Status: < 2 hours           Airway   Mallampati: II  TM distance: >3 FB  Neck ROM: full  No difficulty expected  Dental - normal exam     Pulmonary - negative pulmonary ROS   (-) not a smoker  Cardiovascular - negative cardio ROS  Exercise tolerance: good (4-7 METS)    Rhythm: regular    (-) hypertension, dysrhythmias      Neuro/Psych- negative ROS  (-) seizures, TIA  GI/Hepatic/Renal/Endo - negative ROS     Musculoskeletal (-) negative ROS    Abdominal    Substance History - negative use     OB/GYN    (+) Pregnant        Other                    Anesthesia Plan    ASA 2     epidural     (Chart review)  intravenous induction     Anesthetic plan, risks, benefits, and alternatives have been provided, discussed and informed consent has been obtained with: patient.  Pre-procedure education provided  Use of blood products discussed with patient .    Plan discussed with CRNA.    CODE STATUS:    Level Of Support Discussed With: Patient  Code Status (Patient has no pulse and is not breathing): CPR (Attempt to Resuscitate)  Medical Interventions (Patient has pulse or is breathing): Full Support

## 2025-02-16 NOTE — ANESTHESIA PROCEDURE NOTES
Labor Epidural      Patient location during procedure: OR  Performed By  CRNA/CAA: Kiet Estrada CRNA  Preanesthetic Checklist  Completed: patient identified, IV checked, site marked, risks and benefits discussed, surgical consent, monitors and equipment checked, pre-op evaluation and timeout performed  Prep:  Sterile Tech:gloves, cap and sterile barrier  Prep:chlorhexidine gluconate and isopropyl alcohol  Monitoring:continuous pulse oximetry, EKG and blood pressure monitoring  Epidural Block Procedure:  Approach:midline  Guidance:landmark technique and palpation technique  Location:L4-L5  Needle Type:Tuohy  Needle Gauge:20 G  Loss of Resistance Medium: saline  Loss of Resistance: 5cm  Cath Depth at skin:12 cm  Paresthesia: none  Aspiration:negative  Test Dose:negative  Medication: lidocaine 1.5%-EPINEPHrine 1:200,000 (XYLOCAINE W/EPI) injection - Injection   3 mL - 2/16/2025 8:10:00 AM  Number of Attempts: 1  Post Assessment:  Dressing:secured with tape and occlusive dressing applied  Pt Tolerance:patient tolerated the procedure well with no apparent complications  Complications:no

## 2025-02-16 NOTE — H&P (VIEW-ONLY)
[facility]  Obstetric ED    Chief Complaint   Patient presents with    Contractions     0330       Subjective     I assumed care from Dr. Tong around 0700  Patient reporting mor painful contractions and increased bleeding after returning from bathroom.       Prenatal Information:  External Prenatal Results       Pregnancy Outside Results - Transcribed From Office Records - See Scanned Records For Details       Test Value Date Time    ABO  A  24 07    Rh  Positive  24 07    Antibody Screen  Negative  24 07    Varicella IgG  >4000 index 24 07    Rubella  1.46 index 24 0733    Hgb  9.3 g/dL 25 0525       10.0 g/dL 24 0818       12.5 g/dL 24 0733    Hct  30.9 % 25 0525       38.0 % 24 07    HgB A1c        1h GTT  159 mg/dL 24 0818    3h GTT Fasting  66 mg/dL 24 0709    3h GTT 1 hour  165 mg/dL 24 0709    3h GTT 2 hour  171 mg/dL 24 0709    3h GTT 3 hour   162 mg/dL 24 0709    Gonorrhea (discrete)  Negative  25 0850       Negative  24 0733    Chlamydia (discrete)  Negative  25 0850       Negative  24 0733    RPR  Non Reactive  24 0818       Non Reactive  24 0733    Syphils cascade: TP-Ab (FTA)       TP-Ab       TP-Ab (EIA)       TPPA       HBsAg  Negative  24 0733    Herpes Simplex Virus PCR       Herpes Simplex VIrus Culture       HIV  Non Reactive  24 0733    Hep C RNA Quant PCR       Hep C Antibody       AFP       NIPT       Cystic Fibrosis (Kat)       Cystic Fibroisis        Spinal Muscular atrophy       Fragile X       Group B Strep  Negative  25 0850    GBS Susceptibility to Clindamycin       GBS Susceptibility to Erythromycin       Fetal Fibronectin       Genetic Testing, Maternal Blood                   Past OB History:     OB History    Para Term  AB Living   3 2 2 0 0 2   SAB IAB Ectopic Molar Multiple Live Births   0 0 0 0 0 2      #  Outcome Date GA Lbr Ye/2nd Weight Sex Type Anes PTL Lv   3 Current            2 Term 09/20/23 39w4d 03:54 / 00:09 3430 g (7 lb 9 oz) F Vag-Spont Local N KRISTEL      Birth Comments: HC: 31.5cm      Complications: Precipitant delivery      Name: TIKI YOON      Apgar1: 8  Apgar5: 9   1 Term 04/03/22 39w0d / 01:38 3070 g (6 lb 12.3 oz) M Vag-Spont EPI N KRISTEL      Name: MITRA YOONLALA      Apgar1: 8  Apgar5: 9       Past Medical History: History reviewed. No pertinent past medical history.   Past Surgical History Past Surgical History:   Procedure Laterality Date    WISDOM TOOTH EXTRACTION        Family History: Family History   Problem Relation Age of Onset    Diabetes Father     Hyperthyroidism Mother     No Known Problems Sister     Breast cancer Other 60    Heart disease Maternal Grandfather     Ovarian cancer Neg Hx     Uterine cancer Neg Hx     Colon cancer Neg Hx     Melanoma Neg Hx     Prostate cancer Neg Hx       Social History:  reports that she has never smoked. She has never used smokeless tobacco.   reports no history of alcohol use.   reports no history of drug use.            Objective       Vital Signs Range for the last 24 hours  Temperature: Temp:  [98.2 °F (36.8 °C)] 98.2 °F (36.8 °C)   Temp Source: Temp src: Oral   BP: BP: (107-114)/(61-69) 114/69   Pulse: Heart Rate:  [101-109] 101   Respirations: Resp:  [16] 16   SPO2: SpO2:  [98 %] 98 %   Weight: Weight:  [68.1 kg (150 lb 3.2 oz)] 68.1 kg (150 lb 3.2 oz)     Physical Examination: General appearance - alert, well appearing, and in no distress and in mild to moderate distress    Presentation: VTX by physical exam   Cervix: Exam by: Method: sterile vaginal exam performed   Dilation: Cervical Dilation (cm): 7-8   Effacement: Cervical Effacement: 100   Station:         Fetal Heart Rate Assessment   Method: Fetal HR Assessment Method: external   Beats/min: Fetal HR (beats/min): 150   Baseline: Fetal HR Baseline: normal range   Variability:  Fetal HR Variability: moderate (amplitude range 6 to 25 bpm)   Accels: Fetal HR Accelerations: greater than/equal to 15 bpm, lasting at least 15 seconds   Decels: Fetal HR Decelerations: absent   Tracing Category:       Uterine Assessment   Method: Method: external tocotransducer, palpation, per patient report   Frequency (min): Contraction Frequency (Minutes): 2-3   Ctx Count in 10 min:     Duration:     Intensity: Contraction Intensity: mild by palpation   Intensity by IUPC:     Resting Tone: Uterine Resting Tone: soft by palpation   Resting Tone by IUPC:     [place] Units:       Laboratory Results:    Admission on 02/16/2025   Component Date Value Ref Range Status    Color, UA 02/16/2025 Yellow  Yellow, Straw Final    Appearance, UA 02/16/2025 Clear  Clear Final    pH, UA 02/16/2025 8.0  5.0 - 8.0 Final    Specific Gravity, UA 02/16/2025 1.007  1.005 - 1.030 Final    Glucose, UA 02/16/2025 Negative  Negative Final    Ketones, UA 02/16/2025 Negative  Negative Final    Bilirubin, UA 02/16/2025 Negative  Negative Final    Blood, UA 02/16/2025 Negative  Negative Final    Protein, UA 02/16/2025 Negative  Negative Final    Leuk Esterase, UA 02/16/2025 Large (3+) (A)  Negative Final    Nitrite, UA 02/16/2025 Negative  Negative Final    Urobilinogen, UA 02/16/2025 1.0 E.U./dL  0.2 - 1.0 E.U./dL Final    WBC 02/16/2025 10.80  3.40 - 10.80 10*3/mm3 Final    RBC 02/16/2025 4.00  3.77 - 5.28 10*6/mm3 Final    Hemoglobin 02/16/2025 9.3 (L)  12.0 - 15.9 g/dL Final    Hematocrit 02/16/2025 30.9 (L)  34.0 - 46.6 % Final    MCV 02/16/2025 77.3 (L)  79.0 - 97.0 fL Final    MCH 02/16/2025 23.3 (L)  26.6 - 33.0 pg Final    MCHC 02/16/2025 30.1 (L)  31.5 - 35.7 g/dL Final    RDW 02/16/2025 15.4  12.3 - 15.4 % Final    RDW-SD 02/16/2025 42.3  37.0 - 54.0 fl Final    MPV 02/16/2025 10.8  6.0 - 12.0 fL Final    Platelets 02/16/2025 188  140 - 450 10*3/mm3 Final    Neutrophil % 02/16/2025 81.1 (H)  42.7 - 76.0 % Final    Lymphocyte %  2025 11.6 (L)  19.6 - 45.3 % Final    Monocyte % 2025 5.8  5.0 - 12.0 % Final    Eosinophil % 2025 0.6  0.3 - 6.2 % Final    Basophil % 2025 0.4  0.0 - 1.5 % Final    Immature Grans % 2025 0.5  0.0 - 0.5 % Final    Neutrophils, Absolute 2025 8.76 (H)  1.70 - 7.00 10*3/mm3 Final    Lymphocytes, Absolute 2025 1.25  0.70 - 3.10 10*3/mm3 Final    Monocytes, Absolute 2025 0.63  0.10 - 0.90 10*3/mm3 Final    Eosinophils, Absolute 2025 0.07  0.00 - 0.40 10*3/mm3 Final    Basophils, Absolute 2025 0.04  0.00 - 0.20 10*3/mm3 Final    Immature Grans, Absolute 2025 0.05  0.00 - 0.05 10*3/mm3 Final    nRBC 2025 0.0  0.0 - 0.2 /100 WBC Final    RBC, UA 2025 0-2  None Seen, 0-2 /HPF Final    WBC, UA 2025 21-50 (A)  None Seen, 0-2 /HPF Final    Bacteria, UA 2025 1+ (A)  None Seen /HPF Final    Squamous Epithelial Cells, UA 2025 0-2  None Seen, 0-2 /HPF Final    Hyaline Casts, UA 2025 None Seen  None Seen /LPF Final    Methodology 2025 Automated Microscopy   Final    Glucose 2025 96  70 - 130 mg/dL Final    : [id number] Jeffery Moreno ID: [id number]         Assessment & Plan       Normal labor    Pelvic pain in pregnancy, antepartum, third trimester    39 weeks gestation of pregnancy    UTI (urinary tract infection) during pregnancy        Assessment:  1.  Patient is a 29 y.o.  at 39w1d, who presents with contraction. Was found to have a UTI on UA as was given a dose of Rocephin while in [facility]. Cervix originally /-3 at presentation that changed to 4/90/-3 ~ 2 hours later.   2.  NST reactive  Plan:  1. Admit to L&D for active labor. I spoke with Dr. Smith who will be attending.   2. Plan of care has been reviewed with patient   3. All questions have been answered.      Jordin Cai, DO  2025  07:57 CST

## 2025-02-16 NOTE — OBED NOTES
[facility]  Obstetric ED    Chief Complaint   Patient presents with    Contractions     0330       Subjective     I assumed care from Dr. Tong around 0700  Patient reporting mor painful contractions and increased bleeding after returning from bathroom.       Prenatal Information:  External Prenatal Results       Pregnancy Outside Results - Transcribed From Office Records - See Scanned Records For Details       Test Value Date Time    ABO  A  24 07    Rh  Positive  24 07    Antibody Screen  Negative  24 07    Varicella IgG  >4000 index 24 07    Rubella  1.46 index 24 0733    Hgb  9.3 g/dL 25 0525       10.0 g/dL 24 0818       12.5 g/dL 24 0733    Hct  30.9 % 25 0525       38.0 % 24 07    HgB A1c        1h GTT  159 mg/dL 24 0818    3h GTT Fasting  66 mg/dL 24 0709    3h GTT 1 hour  165 mg/dL 24 0709    3h GTT 2 hour  171 mg/dL 24 0709    3h GTT 3 hour   162 mg/dL 24 0709    Gonorrhea (discrete)  Negative  25 0850       Negative  24 0733    Chlamydia (discrete)  Negative  25 0850       Negative  24 0733    RPR  Non Reactive  24 0818       Non Reactive  24 0733    Syphils cascade: TP-Ab (FTA)       TP-Ab       TP-Ab (EIA)       TPPA       HBsAg  Negative  24 0733    Herpes Simplex Virus PCR       Herpes Simplex VIrus Culture       HIV  Non Reactive  24 0733    Hep C RNA Quant PCR       Hep C Antibody       AFP       NIPT       Cystic Fibrosis (Kat)       Cystic Fibroisis        Spinal Muscular atrophy       Fragile X       Group B Strep  Negative  25 0850    GBS Susceptibility to Clindamycin       GBS Susceptibility to Erythromycin       Fetal Fibronectin       Genetic Testing, Maternal Blood                   Past OB History:     OB History    Para Term  AB Living   3 2 2 0 0 2   SAB IAB Ectopic Molar Multiple Live Births   0 0 0 0 0 2      #  Outcome Date GA Lbr Ye/2nd Weight Sex Type Anes PTL Lv   3 Current            2 Term 09/20/23 39w4d 03:54 / 00:09 3430 g (7 lb 9 oz) F Vag-Spont Local N KRISTEL      Birth Comments: HC: 31.5cm      Complications: Precipitant delivery      Name: TIKI YOON      Apgar1: 8  Apgar5: 9   1 Term 04/03/22 39w0d / 01:38 3070 g (6 lb 12.3 oz) M Vag-Spont EPI N KRISTEL      Name: MITRA YOONLALA      Apgar1: 8  Apgar5: 9       Past Medical History: History reviewed. No pertinent past medical history.   Past Surgical History Past Surgical History:   Procedure Laterality Date    WISDOM TOOTH EXTRACTION        Family History: Family History   Problem Relation Age of Onset    Diabetes Father     Hyperthyroidism Mother     No Known Problems Sister     Breast cancer Other 60    Heart disease Maternal Grandfather     Ovarian cancer Neg Hx     Uterine cancer Neg Hx     Colon cancer Neg Hx     Melanoma Neg Hx     Prostate cancer Neg Hx       Social History:  reports that she has never smoked. She has never used smokeless tobacco.   reports no history of alcohol use.   reports no history of drug use.            Objective       Vital Signs Range for the last 24 hours  Temperature: Temp:  [98.2 °F (36.8 °C)] 98.2 °F (36.8 °C)   Temp Source: Temp src: Oral   BP: BP: (107-114)/(61-69) 114/69   Pulse: Heart Rate:  [101-109] 101   Respirations: Resp:  [16] 16   SPO2: SpO2:  [98 %] 98 %   Weight: Weight:  [68.1 kg (150 lb 3.2 oz)] 68.1 kg (150 lb 3.2 oz)     Physical Examination: General appearance - alert, well appearing, and in no distress and in mild to moderate distress    Presentation: VTX by physical exam   Cervix: Exam by: Method: sterile vaginal exam performed   Dilation: Cervical Dilation (cm): 7-8   Effacement: Cervical Effacement: 100   Station:         Fetal Heart Rate Assessment   Method: Fetal HR Assessment Method: external   Beats/min: Fetal HR (beats/min): 150   Baseline: Fetal HR Baseline: normal range   Variability:  Fetal HR Variability: moderate (amplitude range 6 to 25 bpm)   Accels: Fetal HR Accelerations: greater than/equal to 15 bpm, lasting at least 15 seconds   Decels: Fetal HR Decelerations: absent   Tracing Category:       Uterine Assessment   Method: Method: external tocotransducer, palpation, per patient report   Frequency (min): Contraction Frequency (Minutes): 2-3   Ctx Count in 10 min:     Duration:     Intensity: Contraction Intensity: mild by palpation   Intensity by IUPC:     Resting Tone: Uterine Resting Tone: soft by palpation   Resting Tone by IUPC:     Galt Units:       Laboratory Results:    Admission on 02/16/2025   Component Date Value Ref Range Status    Color, UA 02/16/2025 Yellow  Yellow, Straw Final    Appearance, UA 02/16/2025 Clear  Clear Final    pH, UA 02/16/2025 8.0  5.0 - 8.0 Final    Specific Gravity, UA 02/16/2025 1.007  1.005 - 1.030 Final    Glucose, UA 02/16/2025 Negative  Negative Final    Ketones, UA 02/16/2025 Negative  Negative Final    Bilirubin, UA 02/16/2025 Negative  Negative Final    Blood, UA 02/16/2025 Negative  Negative Final    Protein, UA 02/16/2025 Negative  Negative Final    Leuk Esterase, UA 02/16/2025 Large (3+) (A)  Negative Final    Nitrite, UA 02/16/2025 Negative  Negative Final    Urobilinogen, UA 02/16/2025 1.0 E.U./dL  0.2 - 1.0 E.U./dL Final    WBC 02/16/2025 10.80  3.40 - 10.80 10*3/mm3 Final    RBC 02/16/2025 4.00  3.77 - 5.28 10*6/mm3 Final    Hemoglobin 02/16/2025 9.3 (L)  12.0 - 15.9 g/dL Final    Hematocrit 02/16/2025 30.9 (L)  34.0 - 46.6 % Final    MCV 02/16/2025 77.3 (L)  79.0 - 97.0 fL Final    MCH 02/16/2025 23.3 (L)  26.6 - 33.0 pg Final    MCHC 02/16/2025 30.1 (L)  31.5 - 35.7 g/dL Final    RDW 02/16/2025 15.4  12.3 - 15.4 % Final    RDW-SD 02/16/2025 42.3  37.0 - 54.0 fl Final    MPV 02/16/2025 10.8  6.0 - 12.0 fL Final    Platelets 02/16/2025 188  140 - 450 10*3/mm3 Final    Neutrophil % 02/16/2025 81.1 (H)  42.7 - 76.0 % Final    Lymphocyte %  2025 11.6 (L)  19.6 - 45.3 % Final    Monocyte % 2025 5.8  5.0 - 12.0 % Final    Eosinophil % 2025 0.6  0.3 - 6.2 % Final    Basophil % 2025 0.4  0.0 - 1.5 % Final    Immature Grans % 2025 0.5  0.0 - 0.5 % Final    Neutrophils, Absolute 2025 8.76 (H)  1.70 - 7.00 10*3/mm3 Final    Lymphocytes, Absolute 2025 1.25  0.70 - 3.10 10*3/mm3 Final    Monocytes, Absolute 2025 0.63  0.10 - 0.90 10*3/mm3 Final    Eosinophils, Absolute 2025 0.07  0.00 - 0.40 10*3/mm3 Final    Basophils, Absolute 2025 0.04  0.00 - 0.20 10*3/mm3 Final    Immature Grans, Absolute 2025 0.05  0.00 - 0.05 10*3/mm3 Final    nRBC 2025 0.0  0.0 - 0.2 /100 WBC Final    RBC, UA 2025 0-2  None Seen, 0-2 /HPF Final    WBC, UA 2025 21-50 (A)  None Seen, 0-2 /HPF Final    Bacteria, UA 2025 1+ (A)  None Seen /HPF Final    Squamous Epithelial Cells, UA 2025 0-2  None Seen, 0-2 /HPF Final    Hyaline Casts, UA 2025 None Seen  None Seen /LPF Final    Methodology 2025 Automated Microscopy   Final    Glucose 2025 96  70 - 130 mg/dL Final    : [id number] Jeffery Moreno ID: [id number]         Assessment & Plan       Normal labor    Pelvic pain in pregnancy, antepartum, third trimester    39 weeks gestation of pregnancy    UTI (urinary tract infection) during pregnancy        Assessment:  1.  Patient is a 29 y.o.  at 39w1d, who presents with contraction. Was found to have a UTI on UA as was given a dose of Rocephin while in [facility]. Cervix originally /-3 at presentation that changed to 4/90/-3 ~ 2 hours later.   2.  NST reactive  Plan:  1. Admit to L&D for active labor. I spoke with Dr. Smith who will be attending.   2. Plan of care has been reviewed with patient   3. All questions have been answered.      Jordin Cai, DO  2025  07:57 CST

## 2025-02-17 LAB
BACTERIA SPEC AEROBE CULT: NO GROWTH
HCT VFR BLD AUTO: 32.7 % (ref 34–46.6)
HGB BLD-MCNC: 9.8 G/DL (ref 12–15.9)

## 2025-02-17 PROCEDURE — 0503F POSTPARTUM CARE VISIT: CPT

## 2025-02-17 PROCEDURE — 85018 HEMOGLOBIN: CPT | Performed by: OBSTETRICS & GYNECOLOGY

## 2025-02-17 PROCEDURE — 85014 HEMATOCRIT: CPT | Performed by: OBSTETRICS & GYNECOLOGY

## 2025-02-17 RX ADMIN — ACETAMINOPHEN 650 MG: 650 SOLUTION ORAL at 17:53

## 2025-02-17 RX ADMIN — DOCUSATE SODIUM 100 MG: 100 CAPSULE, LIQUID FILLED ORAL at 08:06

## 2025-02-17 RX ADMIN — IBUPROFEN 600 MG: 100 SUSPENSION ORAL at 20:29

## 2025-02-17 RX ADMIN — PRENATAL VIT W/ FE FUMARATE-FA TAB 27-0.8 MG 1 TABLET: 27-0.8 TAB at 08:06

## 2025-02-17 RX ADMIN — ACETAMINOPHEN 650 MG: 650 SOLUTION ORAL at 06:25

## 2025-02-17 RX ADMIN — IBUPROFEN 600 MG: 100 SUSPENSION ORAL at 10:20

## 2025-02-17 NOTE — ANESTHESIA POSTPROCEDURE EVALUATION
Patient: Nena YOON    Procedure Summary       Date: 02/16/25 Room / Location:     Anesthesia Start: 0758 Anesthesia Stop: 0904    Procedure: LABOR ANALGESIA Diagnosis:     Scheduled Providers:  Provider:     Anesthesia Type: epidural ASA Status: 2            Anesthesia Type: epidural    Vitals  Vitals Value Taken Time   /62 02/17/25 0737   Temp 97.8 °F (36.6 °C) 02/17/25 0737   Pulse 94 02/17/25 0627   Resp 18 02/17/25 0737   SpO2 99 % 02/17/25 0737           Post Anesthesia Care and Evaluation    Patient location during evaluation: bedside  Patient participation: complete - patient participated  Level of consciousness: awake and alert  Pain management: adequate    Airway patency: patent  Anesthetic complications: No anesthetic complications  PONV Status: none  Cardiovascular status: acceptable  Respiratory status: acceptable  Hydration status: acceptable  Post Neuraxial Block status: Motor and sensory function returned to baseline and No signs or symptoms of PDPHNo anesthesia care post op

## 2025-02-17 NOTE — PROGRESS NOTES
"      Anamaria Briones CNM  [facility]  [address]  [place] [address]  Office [phone]  Fax [phone]    [facility]  Vaginal Delivery Progress Note    Subjective   Postpartum Day 1: Vaginal Delivery    The patient feels well.  Her pain is well controlled with nonsteroidal anti-inflammatory drugs and Tylenol.   She is ambulating well.  Patient describes her bleeding as thin lochia.    Breastfeeding: declines.    Objective     Vital Signs Range for the last 24 hours  Temperature: Temp:  [97 °F (36.1 °C)-98.8 °F (37.1 °C)] 97.8 °F (36.6 °C)   Temp Source: Temp src: Temporal   BP: BP: ()/(46-70) 101/62   Pulse: Heart Rate:  [] 94   Respirations: Resp:  [16-18] 18   SPO2: SpO2:  [97 %-100 %] 99 %   O2 Amount (l/min):     O2 Devices Device (Oxygen Therapy): room air   Weight:       Admit Height:  Height: 157.5 cm (62\")      Physical Exam:  General:  no acute distresss.  Abdomen: abdomen is soft without significant tenderness, masses, organomegaly or guarding. Fundus: appropriate, firm, non tender  Extremities: normal, atraumatic, no cyanosis, and trace edema.       Lab results reviewed:  today's labs not available yet; will watch for results.   Rubella:  No results found for: \"RUBELLAIGGIN\" Nurse Transcribed from prenatal record --  No components found for: \"EXTRUBELQUAL\"  Rh Status:    RH type   Date Value Ref Range Status   02/16/2025 Positive  Final     Immunizations:   Immunization History   Administered Date(s) Administered    ABRYSVO (RSV, 60+ or pregnant women 32-36 wks) 12/31/2024    Fluzone (or Fluarix & Flulaval for VFC) >6mos 10/19/2021    Hep B, Unspecified 1995, 1995, 07/02/1996    Influenza, Unspecified 10/23/2018, 10/19/2021, 03/15/2023    MMR 09/30/1996, 07/12/2020    Tdap 01/12/2022, 07/17/2023, 12/16/2024     Lab Results (last 24 hours)       Procedure Component Value Units Date/Time    Treponema pallidum AB w/Reflex RPR [[id number]]  (Normal) Collected: " 02/16/25 0717    Specimen: Blood Updated: 02/16/25 1227     Treponemal AB Total Non-Reactive    Narrative:      Reactive results will reflex RPR testing.    POC Glucose Once [[id number]]  (Normal) Collected: 02/16/25 1145    Specimen: Blood Updated: 02/16/25 1156     Glucose 79 mg/dL      Comment: : [id number] Oseguera ID: [id number]               External Prenatal Results       Pregnancy Outside Results - Transcribed From Office Records - See Scanned Records For Details       Test Value Date Time    ABO  A  02/16/25 0717    Rh  Positive  02/16/25 0717    Antibody Screen  Negative  02/16/25 0717       Negative  07/09/24 0733    Varicella IgG  >4000 index 07/09/24 0733    Rubella  1.46 index 07/09/24 0733    Hgb  9.3 g/dL 02/16/25 0525       10.0 g/dL 12/02/24 0818       12.5 g/dL 07/09/24 0733    Hct  30.9 % 02/16/25 0525       38.0 % 07/09/24 0733    HgB A1c        1h GTT  159 mg/dL 12/02/24 0818    3h GTT Fasting  66 mg/dL 12/11/24 0709    3h GTT 1 hour  165 mg/dL 12/11/24 0709    3h GTT 2 hour  171 mg/dL 12/11/24 0709    3h GTT 3 hour   162 mg/dL 12/11/24 0709    Gonorrhea (discrete)  Negative  01/27/25 0850       Negative  07/09/24 0733    Chlamydia (discrete)  Negative  01/27/25 0850       Negative  07/09/24 0733    RPR  Non Reactive  12/02/24 0818       Non Reactive  07/09/24 0733    Syphils cascade: TP-Ab (FTA)  Non-Reactive  02/16/25 0717    TP-Ab  Non-Reactive  02/16/25 0717    TP-Ab (EIA)       TPPA       HBsAg  Negative  07/09/24 0733    Herpes Simplex Virus PCR       Herpes Simplex VIrus Culture       HIV  Non Reactive  07/09/24 0733    Hep C RNA Quant PCR       Hep C Antibody       AFP       NIPT       Cystic Fibrosis (Kat)       Cystic Fibroisis        Spinal Muscular atrophy       Fragile X       Group B Strep  Negative  01/27/25 0850    GBS Susceptibility to Clindamycin       GBS Susceptibility to Erythromycin       Fetal Fibronectin       Genetic Testing, Maternal Blood                  Drug Screening       Test Value Date Time    Urine Drug Screen       Amphetamine Screen       Barbiturate Screen       Benzodiazepine Screen       Methadone Screen       Phencyclidine Screen       Opiates Screen       THC Screen       Cocaine Screen       Propoxyphene Screen       Buprenorphine Screen       Methamphetamine Screen       Oxycodone Screen       Tricyclic Antidepressants Screen                 Legend    ^: Historical                            Assessment & Plan       Normal labor    Pelvic pain in pregnancy, antepartum, third trimester    39 weeks gestation of pregnancy    UTI (urinary tract infection) during pregnancy      Nena YOON is Day 1  post-partum  Vaginal, Spontaneous  .      Plan:  Continue current care.      This note has been signed electronically.    Anamaria BURK CNM  2/17/2025  08:44 CST

## 2025-02-17 NOTE — PLAN OF CARE
Goal Outcome Evaluation:  Plan of Care Reviewed With: patient        Progress: improving  Outcome Evaluation: VSS, ff ml u1 scant lochia, voiding and ambulating without difficulty, formula feeding, bonding well with infant

## 2025-02-18 VITALS
HEIGHT: 62 IN | RESPIRATION RATE: 18 BRPM | HEART RATE: 83 BPM | BODY MASS INDEX: 27.64 KG/M2 | TEMPERATURE: 97.8 F | DIASTOLIC BLOOD PRESSURE: 58 MMHG | WEIGHT: 150.2 LBS | OXYGEN SATURATION: 98 % | SYSTOLIC BLOOD PRESSURE: 98 MMHG

## 2025-02-18 PROCEDURE — 0503F POSTPARTUM CARE VISIT: CPT | Performed by: ADVANCED PRACTICE MIDWIFE

## 2025-02-18 RX ADMIN — PRENATAL VIT W/ FE FUMARATE-FA TAB 27-0.8 MG 1 TABLET: 27-0.8 TAB at 08:30

## 2025-02-18 RX ADMIN — ACETAMINOPHEN 650 MG: 650 SOLUTION ORAL at 06:27

## 2025-02-18 NOTE — DISCHARGE SUMMARY
[facility]    Stephanie Duffyn, APRN  [address]  [place] [address]  [phone]      Discharge Summary     Alessio YOON  : 1995  MRN: [id number]  CSN: [id number]    Date of Admission: 2025   Date of Discharge:  2025   Delivering Physician: Tapan Smith       Admission Diagnosis: Normal labor [O80, Z37.9]   Discharge Diagnosis: Pregnancy at 39w1d - delivered       Procedures: 2025 - Vaginal, Spontaneous      Hospital Course  Patient is a 29 y.o.  who at 39w1d had a vaginal birth.  Her postpartum course was without complications.  On PPD 2, she was ready for discharge.  She had normal lochia and pain managed oral medication and comfort products.     Infant  male fetus weighing 3730 g (8 lb 3.6 oz)  Apgars -  8 @ 1 minute /  9 @ 5 minutes.    Discharge labs  Lab Results   Component Value Date    WBC 10.80 2025    HGB 9.8 (L) 2025    HCT 32.7 (L) 2025     2025       Discharge Medications     Discharge Medications        Continue These Medications        Instructions Start Date   freestyle lancets   Use as directed four times a day      FreeStyle Lite w/Device kit   1 each, Not Applicable, Daily      prenatal (CLASSIC) vitamin 28-0.8 MG tablet tablet  Generic drug: prenatal vitamin   Daily             Stop These Medications      FREESTYLE LITE test strip  Generic drug: glucose blood              External Prenatal Results       Pregnancy Outside Results - Transcribed From Office Records - See Scanned Records For Details       Test Value Date Time    ABO  A  25 0717    Rh  Positive  25 0717    Antibody Screen  Negative  25 0717       Negative  24 0733    Varicella IgG  >4000 index 24 0733    Rubella  1.46 index 24 0733    Hgb  9.8 g/dL 25 1030       9.3 g/dL 25 0525       10.0 g/dL 24 0818       12.5 g/dL 24 0733    Hct  32.7 % 25 1030       30.9  % 02/16/25 0525       38.0 % 07/09/24 0733    HgB A1c        1h GTT  159 mg/dL 12/02/24 0818    3h GTT Fasting  66 mg/dL 12/11/24 0709    3h GTT 1 hour  165 mg/dL 12/11/24 0709    3h GTT 2 hour  171 mg/dL 12/11/24 0709    3h GTT 3 hour   162 mg/dL 12/11/24 0709    Gonorrhea (discrete)  Negative  01/27/25 0850       Negative  07/09/24 0733    Chlamydia (discrete)  Negative  01/27/25 0850       Negative  07/09/24 0733    RPR  Non Reactive  12/02/24 0818       Non Reactive  07/09/24 0733    Syphils cascade: TP-Ab (FTA)  Non-Reactive  02/16/25 0717    TP-Ab  Non-Reactive  02/16/25 0717    TP-Ab (EIA)       TPPA       HBsAg  Negative  07/09/24 0733    Herpes Simplex Virus PCR       Herpes Simplex VIrus Culture       HIV  Non Reactive  07/09/24 0733    Hep C RNA Quant PCR       Hep C Antibody       AFP       NIPT       Cystic Fibrosis (Kat)       Cystic Fibroisis        Spinal Muscular atrophy       Fragile X       Group B Strep  Negative  01/27/25 0850    GBS Susceptibility to Clindamycin       GBS Susceptibility to Erythromycin       Fetal Fibronectin       Genetic Testing, Maternal Blood                 Drug Screening       Test Value Date Time    Urine Drug Screen       Amphetamine Screen       Barbiturate Screen       Benzodiazepine Screen       Methadone Screen       Phencyclidine Screen       Opiates Screen       THC Screen       Cocaine Screen       Propoxyphene Screen       Buprenorphine Screen       Methamphetamine Screen       Oxycodone Screen       Tricyclic Antidepressants Screen                 Legend    ^: Historical                            Discharge Disposition Home or Self Care   Condition on Discharge: good   Follow-up: 2 weeks with JUVE Briones for mood check       Plan for discharge reviewed with Dr. Champagne    This note has been signed electronically.    Stephanie Cook, DNP, APRN, JUVE  2/18/2025

## 2025-02-18 NOTE — PLAN OF CARE
Goal Outcome Evaluation:  Plan of Care Reviewed With: patient        Progress: improving  Outcome Evaluation: vss, ffmlu1, scant lochia, voiding and ambulating, formula feeding, bonding well with infant, showered this shift

## 2025-02-18 NOTE — PLAN OF CARE
Goal Outcome Evaluation:  Plan of Care Reviewed With: patient        Progress: improving  Outcome Evaluation: VSS, ff ml u1 scant lochia, voiding and ambulating, formula feeding, bonding well with infant, liquid ibuprofen given x1 for cramping, spouse at bedside

## 2025-02-18 NOTE — PROGRESS NOTES
"      BHARGAVI Pabon  [facility]  [address]  [place] [address]  Office [phone]  Fax [phone]    [facility]  Vaginal Delivery Progress Note    Subjective   Postpartum Day 2: Vaginal Delivery    Feeling well and prepared for discharge. Feels her breasts are becoming firmer.  Discomfort has been managed with Motrin and Tylenol, along with comfort products.  Ambulating and voiding without difficulty.  Describes bleeding as lighter, without clots.  Declines breast-feeding.      Objective     Vital Signs Range for the last 24 hours  Temperature: Temp:  [97.3 °F (36.3 °C)-97.8 °F (36.6 °C)] 97.8 °F (36.6 °C)   Temp Source: Temp src: Temporal   BP: BP: ()/(57-58) 98/58   Pulse: Heart Rate:  [82-83] 83   Respirations: Resp:  [18-20] 18   SPO2: SpO2:  [97 %-98 %] 98 %   O2 Amount (l/min):     O2 Devices Device (Oxygen Therapy): room air   Weight:       Admit Height:  Height: 157.5 cm (62\")      Physical Exam:  General:  no acute distresss. Cooperative.  Normal affect and behavior.  Abdomen: Soft, nontender; fundus firm  Extremities: calves nontender, movement without difficulty, no edema    Lab results reviewed:  Yes   Rubella:  No results found for: \"RUBELLAIGGIN\" Nurse Transcribed from prenatal record --  No components found for: \"EXTRUBELQUAL\"  Rh Status:    RH type   Date Value Ref Range Status   02/16/2025 Positive  Final     Immunizations:   Immunization History   Administered Date(s) Administered    ABRYSVO (RSV, 60+ or pregnant women 32-36 wks) 12/31/2024    Fluzone (or Fluarix & Flulaval for VFC) >6mos 10/19/2021    Hep B, Unspecified 1995, 1995, 07/02/1996    Influenza, Unspecified 10/23/2018, 10/19/2021, 03/15/2023    MMR 09/30/1996, 07/12/2020    Tdap 01/12/2022, 07/17/2023, 12/16/2024     Lab Results (last 24 hours)       Procedure Component Value Units Date/Time    Urine Culture - Urine, Urine, Clean Catch [[id number]]  (Normal) Collected: 02/16/25 0514    " Specimen: Urine, Clean Catch Updated: 02/17/25 1205     Urine Culture No growth    Hemoglobin & Hematocrit, Blood [[id number]]  (Abnormal) Collected: 02/17/25 1030    Specimen: Blood Updated: 02/17/25 1047     Hemoglobin 9.8 g/dL      Hematocrit 32.7 %     Tissue Pathology Exam [[id number]] Collected: 02/16/25 0918    Specimen: Tissue from Placenta Updated: 02/17/25 0921            External Prenatal Results       Pregnancy Outside Results - Transcribed From Office Records - See Scanned Records For Details       Test Value Date Time    ABO  A  02/16/25 0717    Rh  Positive  02/16/25 0717    Antibody Screen  Negative  02/16/25 0717       Negative  07/09/24 0733    Varicella IgG  >4000 index 07/09/24 0733    Rubella  1.46 index 07/09/24 0733    Hgb  9.8 g/dL 02/17/25 1030       9.3 g/dL 02/16/25 0525       10.0 g/dL 12/02/24 0818       12.5 g/dL 07/09/24 0733    Hct  32.7 % 02/17/25 1030       30.9 % 02/16/25 0525       38.0 % 07/09/24 0733    HgB A1c        1h GTT  159 mg/dL 12/02/24 0818    3h GTT Fasting  66 mg/dL 12/11/24 0709    3h GTT 1 hour  165 mg/dL 12/11/24 0709    3h GTT 2 hour  171 mg/dL 12/11/24 0709    3h GTT 3 hour   162 mg/dL 12/11/24 0709    Gonorrhea (discrete)  Negative  01/27/25 0850       Negative  07/09/24 0733    Chlamydia (discrete)  Negative  01/27/25 0850       Negative  07/09/24 0733    RPR  Non Reactive  12/02/24 0818       Non Reactive  07/09/24 0733    Syphils cascade: TP-Ab (FTA)  Non-Reactive  02/16/25 0717    TP-Ab  Non-Reactive  02/16/25 0717    TP-Ab (EIA)       TPPA       HBsAg  Negative  07/09/24 0733    Herpes Simplex Virus PCR       Herpes Simplex VIrus Culture       HIV  Non Reactive  07/09/24 0733    Hep C RNA Quant PCR       Hep C Antibody       AFP       NIPT       Cystic Fibrosis (Kat)       Cystic Fibroisis        Spinal Muscular atrophy       Fragile X       Group B Strep  Negative  01/27/25 0850    GBS Susceptibility to Clindamycin       GBS Susceptibility to Erythromycin        Fetal Fibronectin       Genetic Testing, Maternal Blood                 Drug Screening       Test Value Date Time    Urine Drug Screen       Amphetamine Screen       Barbiturate Screen       Benzodiazepine Screen       Methadone Screen       Phencyclidine Screen       Opiates Screen       THC Screen       Cocaine Screen       Propoxyphene Screen       Buprenorphine Screen       Methamphetamine Screen       Oxycodone Screen       Tricyclic Antidepressants Screen                 Legend    ^: Historical                            Assessment & Plan       Normal labor    Pelvic pain in pregnancy, antepartum, third trimester    39 weeks gestation of pregnancy    UTI (urinary tract infection) during pregnancy      Nena YOON is Day 2  post-partum  Vaginal, Spontaneous  .      Plan:  Continue current postpartum plan.  Plan for discharge today with standard precautions.  Questions answered.  Return to clinic in 6 weeks for postpartum visit and as needed with concerns.     Plan for discharge reviewed with Dr. Champagne.     This note has been signed electronically.    Stephanie Cook, JAY, APRN, CNM  2/18/2025  09:03 CST

## 2025-02-19 ENCOUNTER — MATERNAL SCREENING (OUTPATIENT)
Dept: CALL CENTER | Facility: HOSPITAL | Age: 30
End: 2025-02-19
Payer: COMMERCIAL

## 2025-02-19 LAB
CYTO UR: NORMAL
LAB AP CASE REPORT: NORMAL
Lab: NORMAL
PATH REPORT.FINAL DX SPEC: NORMAL
PATH REPORT.GROSS SPEC: NORMAL

## 2025-02-19 NOTE — OUTREACH NOTE
Maternal Screening Survey      Flowsheet Row Responses   Eligibility Eligible   Prep survey completed? Yes   Facility patient discharged from? [facility] - Registered Nurse

## 2025-02-27 ENCOUNTER — MATERNAL SCREENING (OUTPATIENT)
Dept: CALL CENTER | Facility: HOSPITAL | Age: 30
End: 2025-02-27
Payer: COMMERCIAL

## 2025-02-27 NOTE — OUTREACH NOTE
Maternal Screening Survey      Flowsheet Row Responses   Facility patient discharged from? [facility]   Attempt successful? No   Unsuccessful attempts Attempt 1              Jen LANDAVERDE - Registered Nurse

## 2025-02-27 NOTE — OUTREACH NOTE
Maternal Screening Survey      Flowsheet Row Responses   Facility patient discharged from? [place]   Attempt successful? No   Unsuccessful attempts Attempt 2              Jen LANDAVERDE - Registered Nurse

## 2025-02-28 ENCOUNTER — MATERNAL SCREENING (OUTPATIENT)
Dept: CALL CENTER | Facility: HOSPITAL | Age: 30
End: 2025-02-28
Payer: COMMERCIAL

## 2025-02-28 NOTE — OUTREACH NOTE
Maternal Screening Survey      Flowsheet Row Responses   Facility patient discharged from? [facility]   Attempt successful? No   Unsuccessful attempts Attempt 3   Revoke Unable to reach              Fabiana KELLEY - Registered Nurse

## 2025-04-03 ENCOUNTER — POSTPARTUM VISIT (OUTPATIENT)
Age: 30
End: 2025-04-03
Payer: COMMERCIAL

## 2025-04-03 VITALS
WEIGHT: 136.4 LBS | HEIGHT: 62 IN | DIASTOLIC BLOOD PRESSURE: 66 MMHG | BODY MASS INDEX: 25.1 KG/M2 | SYSTOLIC BLOOD PRESSURE: 122 MMHG

## 2025-04-03 DIAGNOSIS — Z30.011 ENCOUNTER FOR INITIAL PRESCRIPTION OF CONTRACEPTIVE PILLS: ICD-10-CM

## 2025-04-03 PROBLEM — O26.893 PELVIC PAIN IN PREGNANCY, ANTEPARTUM, THIRD TRIMESTER: Status: RESOLVED | Noted: 2025-02-16 | Resolved: 2025-04-03

## 2025-04-03 PROBLEM — Z3A.39 39 WEEKS GESTATION OF PREGNANCY: Status: RESOLVED | Noted: 2025-02-16 | Resolved: 2025-04-03

## 2025-04-03 PROBLEM — O23.40 UTI (URINARY TRACT INFECTION) DURING PREGNANCY: Status: RESOLVED | Noted: 2025-02-16 | Resolved: 2025-04-03

## 2025-04-03 PROBLEM — R10.2 PELVIC PAIN IN PREGNANCY, ANTEPARTUM, THIRD TRIMESTER: Status: RESOLVED | Noted: 2025-02-16 | Resolved: 2025-04-03

## 2025-04-03 PROBLEM — Z37.9 NORMAL LABOR: Status: RESOLVED | Noted: 2025-02-16 | Resolved: 2025-04-03

## 2025-04-03 RX ORDER — DROSPIRENONE AND ETHINYL ESTRADIOL 0.02-3(28)
1 KIT ORAL DAILY
Qty: 28 TABLET | Refills: 12 | Status: SHIPPED | OUTPATIENT
Start: 2025-04-03 | End: 2026-04-03

## 2025-04-03 NOTE — PROGRESS NOTES
"Nena YOON is here for a postpartum visit after a vaginal delivery 6 weeks ago.  The depression questionnaire has been completed.  She has no signs of postpartum depression today.  She has started a period since her delivery and is formula feeding her infant.  Her last Pap smear was 5/2021 and normal.  She has no history of cervical dysplasia.  She would like OCPs for contraception.    /66 (BP Location: Left arm, Patient Position: Sitting, Cuff Size: Adult)   Ht 157.5 cm (62.01\")   Wt 61.9 kg (136 lb 6.4 oz)   LMP  (LMP Unknown)   Breastfeeding No   BMI 24.94 kg/m²    In general pleasant female no acute distress  Neck no thyromegaly  Abdomen soft and nontender  A Pap smear was not performed.     Assessment: Normal postpartum exam.    We have discussed current Pap smear screening guidelines. I have given her a prescription for a birth control pill and we have discussed common side effects and adverse events. Nena will return in 3 months for annual exam, or sooner if needed.  "

## 2025-07-08 ENCOUNTER — OFFICE VISIT (OUTPATIENT)
Age: 30
End: 2025-07-08
Payer: COMMERCIAL

## 2025-07-08 VITALS
BODY MASS INDEX: 24.48 KG/M2 | HEIGHT: 62 IN | DIASTOLIC BLOOD PRESSURE: 68 MMHG | WEIGHT: 133 LBS | SYSTOLIC BLOOD PRESSURE: 102 MMHG

## 2025-07-08 DIAGNOSIS — Z01.419 WELL WOMAN EXAM WITH ROUTINE GYNECOLOGICAL EXAM: Primary | ICD-10-CM

## 2025-07-08 DIAGNOSIS — Z30.41 ORAL CONTRACEPTIVE PILL SURVEILLANCE: ICD-10-CM

## 2025-07-08 DIAGNOSIS — Z12.4 SCREENING FOR CERVICAL CANCER: ICD-10-CM

## 2025-07-08 PROCEDURE — G0123 SCREEN CERV/VAG THIN LAYER: HCPCS | Performed by: ADVANCED PRACTICE MIDWIFE

## 2025-07-08 PROCEDURE — 87624 HPV HI-RISK TYP POOLED RSLT: CPT | Performed by: ADVANCED PRACTICE MIDWIFE

## 2025-07-08 RX ORDER — DROSPIRENONE AND ETHINYL ESTRADIOL 0.02-3(28)
1 KIT ORAL DAILY
Qty: 84 TABLET | Refills: 3 | Status: SHIPPED | OUTPATIENT
Start: 2025-07-08 | End: 2026-07-08

## 2025-07-08 NOTE — PROGRESS NOTES
"Mehdi YOON is a 30 y.o. female    History of Present Illness  The patient is a 30-year-old female who presents for a well-woman visit with surveillance of oral contraceptive use.    She reports no current health concerns or discomforts.     She is currently on Liz. She is currently adjusting to her birth control regimen, which initially caused some nausea. To manage this, she takes the medication at night. She missed one dose, which resulted in some bleeding. This month marks the completion of her first pack of birth control pills. She prefers to receive her medication in 3-month supplies. Denies other adverse side effects.     She does not believe there is a need for sexually transmitted infection screening today. She has not received the HPV vaccine series.     She maintains a regular diet and exercise routine.     She formula feeds her baby with Similac Alimentum, which has led to some colic. The introduction of baby food has recently started.        /68 (BP Location: Right arm, Patient Position: Sitting, Cuff Size: Large Adult)   Ht 157.5 cm (62.01\")   Wt 60.3 kg (133 lb)   LMP 07/05/2025   Breastfeeding No   BMI 24.32 kg/m²     Outpatient Encounter Medications as of 7/8/2025   Medication Sig Dispense Refill    drospirenone-ethinyl estradiol (LIZ) 3-0.02 MG per tablet Take 1 tablet by mouth Daily. 84 tablet 3    [DISCONTINUED] drospirenone-ethinyl estradiol (LIZ) 3-0.02 MG per tablet Take 1 tablet by mouth Daily. 28 tablet 12     No facility-administered encounter medications on file as of 7/8/2025.       Surgical History  Past Surgical History:   Procedure Laterality Date    WISDOM TOOTH EXTRACTION         Family History  Family History   Problem Relation Age of Onset    Diabetes Father     Hyperthyroidism Mother     No Known Problems Sister     Breast cancer Other 60    Heart disease Maternal Grandfather     Ovarian cancer Neg Hx     Uterine cancer Neg Hx     Colon cancer Neg Hx "     Melanoma Neg Hx     Prostate cancer Neg Hx        The following portions of the patient's history were reviewed and updated as appropriate: allergies, current medications, past family history, past medical history, past social history, past surgical history, and problem list.    Review of Systems   Constitutional:  Negative for fatigue.   HENT: Negative.     Eyes: Negative.    Respiratory:  Negative for shortness of breath.    Cardiovascular:  Negative for chest pain and leg swelling.   Gastrointestinal:  Negative for abdominal pain, constipation, diarrhea and GERD.   Endocrine: Positive for cold intolerance. Negative for heat intolerance.   Genitourinary:  Negative for amenorrhea, breast discharge, breast lump, breast pain, decreased libido, difficulty urinating, dyspareunia, dysuria, frequency, hematuria, menstrual problem, pelvic pain, pelvic pressure, urgency, urinary incontinence, vaginal bleeding and vaginal discharge.   Musculoskeletal:  Negative for back pain and myalgias.   Skin:  Negative for rash, skin lesions and wound.   Neurological:  Negative for dizziness and headache.   Hematological:  Does not bruise/bleed easily.   Psychiatric/Behavioral:  Negative for dysphoric mood, self-injury, suicidal ideas and depressed mood. The patient is not nervous/anxious.        Objective   Physical Exam  Vitals and nursing note reviewed. Exam conducted with a chaperone present.   Constitutional:       General: She is not in acute distress.     Appearance: Normal appearance. She is well-developed and well-groomed. She is not ill-appearing or diaphoretic.   HENT:      Head: Normocephalic and atraumatic.   Eyes:      General: No scleral icterus.  Neck:      Thyroid: No thyroid mass, thyromegaly or thyroid tenderness.   Cardiovascular:      Rate and Rhythm: Normal rate and regular rhythm.      Pulses: Normal pulses.      Heart sounds: Normal heart sounds. No murmur heard.  Pulmonary:      Effort: Pulmonary effort is  normal. No accessory muscle usage or respiratory distress.      Breath sounds: Normal breath sounds and air entry.   Chest:      Chest wall: No mass, deformity, swelling or tenderness.   Breasts:     Breasts are symmetrical.      Right: Normal.      Left: Normal.   Abdominal:      General: There is no distension.      Palpations: Abdomen is soft.      Tenderness: There is no abdominal tenderness. There is no right CVA tenderness, left CVA tenderness, guarding or rebound.   Genitourinary:     General: Normal vulva.      Exam position: Lithotomy position.      Pubic Area: No rash.       Labia:         Right: No rash, tenderness or lesion.         Left: No rash, tenderness or lesion.       Vagina: No signs of injury and foreign body. No vaginal discharge, erythema, tenderness, lesions or prolapsed vaginal walls.      Cervix: Normal. No cervical motion tenderness.      Uterus: Normal.       Adnexa:         Right: No mass, tenderness or fullness.          Left: No mass, tenderness or fullness.        Rectum: Normal.      Comments: Perineum normal.  Musculoskeletal:         General: No tenderness. Normal range of motion.      Cervical back: Normal range of motion and neck supple. No edema or tenderness. No pain with movement.      Right lower leg: No edema.      Left lower leg: No edema.   Lymphadenopathy:      Head:      Right side of head: No submental, submandibular, tonsillar, preauricular or posterior auricular adenopathy.      Left side of head: No submental, submandibular, tonsillar, preauricular or posterior auricular adenopathy.      Cervical: No cervical adenopathy.      Upper Body:      Right upper body: No supraclavicular, axillary or pectoral adenopathy.      Left upper body: No supraclavicular, axillary or pectoral adenopathy.   Skin:     General: Skin is warm and dry.      Capillary Refill: Capillary refill takes less than 2 seconds.      Coloration: Skin is not cyanotic, jaundiced, mottled or pale.       Findings: No bruising, erythema, lesion or rash.   Neurological:      General: No focal deficit present.      Mental Status: She is alert and oriented to person, place, and time.      Gait: Gait normal.   Psychiatric:         Mood and Affect: Mood and affect normal.         Speech: Speech normal.         Behavior: Behavior normal. Behavior is cooperative.         Thought Content: Thought content normal.         Judgment: Judgment normal.         Chart reviewed for screenings  Cervical Cancer Screenin2021 NILM  HPV vaccine: not completed per available immunization record or patient report  Breast Cancer Screening: begin age 40  Colon Cancer Screening: begin age 45  Osteoporosis Screening: begin by 5 years after the onset of menopause    Assessment & Plan   Diagnoses and all orders for this visit:    1. Well woman exam with routine gynecological exam (Primary)    2. Screening for cervical cancer  -     Liquid-based Pap Smear, Screening  -     HPV DNA Probe, Direct - ThinPrep Vial, Cervix    3. Oral contraceptive pill surveillance  -     drospirenone-ethinyl estradiol (LIZ) 3-0.02 MG per tablet; Take 1 tablet by mouth Daily.  Dispense: 84 tablet; Refill: 3    4. BMI 24.0-24.9, adult    BMI is within normal parameters. No other follow-up for BMI required.  Assessment & Plan  1. Well-woman visit.  - Blood pressure readings are within the normal range today at 102/68.  - Weight is within the healthy range, resulting in a BMI of 24.  - She is currently on Liz for birth control and has experienced some nausea, which she manages by taking the medication at night. A refill for Liz will be sent to the pharmacy, and it will be provided in a 3-month supply for insurance benefits. Signs to report and side effects reviewed.   - A Pap smear will be conducted today to ensure it is up to date within ASCCP guidelines.   - Advised to maintain good hydration and engage in mindfulness-based stress reduction techniques. Monthly  massages are recommended for overall well-being. Encouraged daily self-care for physical, mental, and emotional well-being.     2. Postpartum depression risk.  - Depression screening score today was zero, indicating a low risk for depression.  - She is informed that symptoms of postpartum depression can manifest anytime within the first year after childbirth. She is advised to contact the clinic if she starts experiencing any symptoms of postpartum depression.    3. Infant care.  - She formula feeds her baby with Similac Alimentum, which has led to some colic. The introduction of baby food has recently started and will help with colic.    Follow-up: The patient will follow up in 1 year.      This note has been signed electronically.    Stephanie Cook DNP, BHARGAVI, JUVE  7/8/2025    Patient or patient representative verbalized consent for the use of Ambient Listening during the visit with  BHARGAVI Pabon for chart documentation. 7/8/2025  13:08 CDT

## 2025-07-09 LAB
GEN CATEG CVX/VAG CYTO-IMP: NORMAL
HPV I/H RISK 4 DNA CVX QL PROBE+SIG AMP: NOT DETECTED
LAB AP CASE REPORT: NORMAL
LAB AP GYN ADDITIONAL INFORMATION: NORMAL
Lab: NORMAL
PATH INTERP SPEC-IMP: NORMAL
STAT OF ADQ CVX/VAG CYTO-IMP: NORMAL